# Patient Record
Sex: MALE | Race: WHITE | NOT HISPANIC OR LATINO | Employment: OTHER | URBAN - METROPOLITAN AREA
[De-identification: names, ages, dates, MRNs, and addresses within clinical notes are randomized per-mention and may not be internally consistent; named-entity substitution may affect disease eponyms.]

---

## 2017-01-26 ENCOUNTER — HOSPITAL ENCOUNTER (EMERGENCY)
Facility: HOSPITAL | Age: 37
Discharge: HOME | End: 2017-01-26
Attending: EMERGENCY MEDICINE
Payer: COMMERCIAL

## 2017-01-26 ENCOUNTER — APPOINTMENT (EMERGENCY)
Dept: RADIOLOGY | Facility: HOSPITAL | Age: 37
End: 2017-01-26
Payer: COMMERCIAL

## 2017-01-26 VITALS
BODY MASS INDEX: 34.36 KG/M2 | HEIGHT: 70 IN | SYSTOLIC BLOOD PRESSURE: 138 MMHG | HEART RATE: 68 BPM | OXYGEN SATURATION: 98 % | RESPIRATION RATE: 16 BRPM | WEIGHT: 240 LBS | DIASTOLIC BLOOD PRESSURE: 84 MMHG | TEMPERATURE: 97.9 F

## 2017-01-26 DIAGNOSIS — R07.9 CHEST PAIN: Primary | ICD-10-CM

## 2017-01-26 LAB
ANION GAP SERPL CALCULATED.3IONS-SCNC: 10 MMOL/L (ref 4–13)
BASOPHILS # BLD AUTO: 0.06 THOUSANDS/ΜL (ref 0–0.1)
BASOPHILS NFR BLD AUTO: 1 % (ref 0–1)
BUN SERPL-MCNC: 10 MG/DL (ref 5–25)
CALCIUM SERPL-MCNC: 9.6 MG/DL (ref 8.3–10.1)
CHLORIDE SERPL-SCNC: 103 MMOL/L (ref 100–108)
CO2 SERPL-SCNC: 28 MMOL/L (ref 21–32)
CREAT SERPL-MCNC: 0.92 MG/DL (ref 0.6–1.3)
EOSINOPHIL # BLD AUTO: 0.17 THOUSAND/ΜL (ref 0–0.61)
EOSINOPHIL NFR BLD AUTO: 2 % (ref 0–6)
ERYTHROCYTE [DISTWIDTH] IN BLOOD BY AUTOMATED COUNT: 12 % (ref 11.6–15.1)
GFR SERPL CREATININE-BSD FRML MDRD: >60 ML/MIN/1.73SQ M
GLUCOSE SERPL-MCNC: 98 MG/DL (ref 65–140)
HCT VFR BLD AUTO: 46.8 % (ref 36.5–49.3)
HGB BLD-MCNC: 15.7 G/DL (ref 12–17)
LYMPHOCYTES # BLD AUTO: 2.3 THOUSANDS/ΜL (ref 0.6–4.47)
LYMPHOCYTES NFR BLD AUTO: 25 % (ref 14–44)
MCH RBC QN AUTO: 29 PG (ref 26.8–34.3)
MCHC RBC AUTO-ENTMCNC: 33.5 G/DL (ref 31.4–37.4)
MCV RBC AUTO: 86 FL (ref 82–98)
MONOCYTES # BLD AUTO: 0.57 THOUSAND/ΜL (ref 0.17–1.22)
MONOCYTES NFR BLD AUTO: 6 % (ref 4–12)
NEUTROPHILS # BLD AUTO: 6.24 THOUSANDS/ΜL (ref 1.85–7.62)
NEUTS SEG NFR BLD AUTO: 67 % (ref 43–75)
NRBC BLD AUTO-RTO: 0 /100 WBCS
PLATELET # BLD AUTO: 287 THOUSANDS/UL (ref 149–390)
PMV BLD AUTO: 9.5 FL (ref 8.9–12.7)
POTASSIUM SERPL-SCNC: 3.8 MMOL/L (ref 3.5–5.3)
RBC # BLD AUTO: 5.42 MILLION/UL (ref 3.88–5.62)
SODIUM SERPL-SCNC: 141 MMOL/L (ref 136–145)
TROPONIN I SERPL-MCNC: <0.02 NG/ML
TROPONIN I SERPL-MCNC: <0.02 NG/ML
WBC # BLD AUTO: 9.37 THOUSAND/UL (ref 4.31–10.16)

## 2017-01-26 PROCEDURE — 85025 COMPLETE CBC W/AUTO DIFF WBC: CPT

## 2017-01-26 PROCEDURE — 36415 COLL VENOUS BLD VENIPUNCTURE: CPT

## 2017-01-26 PROCEDURE — 80048 BASIC METABOLIC PNL TOTAL CA: CPT

## 2017-01-26 PROCEDURE — 96374 THER/PROPH/DIAG INJ IV PUSH: CPT

## 2017-01-26 PROCEDURE — 84484 ASSAY OF TROPONIN QUANT: CPT

## 2017-01-26 PROCEDURE — 99285 EMERGENCY DEPT VISIT HI MDM: CPT

## 2017-01-26 PROCEDURE — 93005 ELECTROCARDIOGRAM TRACING: CPT

## 2017-01-26 PROCEDURE — 84484 ASSAY OF TROPONIN QUANT: CPT | Performed by: EMERGENCY MEDICINE

## 2017-01-26 PROCEDURE — 71020 HB CHEST X-RAY 2VW FRONTAL&LATL: CPT

## 2017-01-26 RX ORDER — NAPROXEN 500 MG/1
500 TABLET ORAL 2 TIMES DAILY WITH MEALS
Qty: 60 TABLET | Refills: 0 | Status: SHIPPED | OUTPATIENT
Start: 2017-01-26 | End: 2018-03-12 | Stop reason: ALTCHOICE

## 2017-01-26 RX ORDER — KETOROLAC TROMETHAMINE 30 MG/ML
30 INJECTION, SOLUTION INTRAMUSCULAR; INTRAVENOUS ONCE
Status: COMPLETED | OUTPATIENT
Start: 2017-01-26 | End: 2017-01-26

## 2017-01-26 RX ORDER — FAMOTIDINE 20 MG/1
20 TABLET, FILM COATED ORAL 2 TIMES DAILY
Qty: 60 TABLET | Refills: 0 | Status: SHIPPED | OUTPATIENT
Start: 2017-01-26 | End: 2018-03-12 | Stop reason: ALTCHOICE

## 2017-01-26 RX ADMIN — KETOROLAC TROMETHAMINE 30 MG: 30 INJECTION, SOLUTION INTRAMUSCULAR at 21:14

## 2017-01-27 LAB
ATRIAL RATE: 66 BPM
P AXIS: 9 DEGREES
PR INTERVAL: 162 MS
QRS AXIS: -10 DEGREES
QRSD INTERVAL: 96 MS
QT INTERVAL: 396 MS
QTC INTERVAL: 415 MS
T WAVE AXIS: 2 DEGREES
VENTRICULAR RATE: 66 BPM

## 2017-07-28 ENCOUNTER — HOSPITAL ENCOUNTER (EMERGENCY)
Facility: HOSPITAL | Age: 37
Discharge: HOME | End: 2017-07-28
Attending: EMERGENCY MEDICINE | Admitting: EMERGENCY MEDICINE
Payer: COMMERCIAL

## 2017-07-28 VITALS
HEART RATE: 83 BPM | WEIGHT: 240 LBS | DIASTOLIC BLOOD PRESSURE: 104 MMHG | HEIGHT: 70 IN | TEMPERATURE: 98.3 F | OXYGEN SATURATION: 96 % | BODY MASS INDEX: 34.36 KG/M2 | SYSTOLIC BLOOD PRESSURE: 173 MMHG | RESPIRATION RATE: 18 BRPM

## 2017-07-28 DIAGNOSIS — J01.90 ACUTE SINUSITIS TREATED WITH ANTIBIOTICS IN THE PAST 60 DAYS: ICD-10-CM

## 2017-07-28 DIAGNOSIS — J34.89 SINUS PRESSURE: Primary | ICD-10-CM

## 2017-07-28 PROCEDURE — 99282 EMERGENCY DEPT VISIT SF MDM: CPT

## 2017-07-28 RX ORDER — FLUTICASONE PROPIONATE 50 MCG
1 SPRAY, SUSPENSION (ML) NASAL DAILY
Qty: 16 G | Refills: 0 | Status: SHIPPED | OUTPATIENT
Start: 2017-07-28 | End: 2018-03-12 | Stop reason: ALTCHOICE

## 2017-07-28 RX ORDER — AMOXICILLIN AND CLAVULANATE POTASSIUM 875; 125 MG/1; MG/1
1 TABLET, FILM COATED ORAL EVERY 12 HOURS
Qty: 14 TABLET | Refills: 0 | Status: SHIPPED | OUTPATIENT
Start: 2017-07-28 | End: 2017-08-04

## 2018-03-12 ENCOUNTER — HOSPITAL ENCOUNTER (INPATIENT)
Facility: HOSPITAL | Age: 38
LOS: 1 days | Discharge: HOME | DRG: 914 | End: 2018-03-13
Attending: SURGERY | Admitting: SURGERY
Payer: COMMERCIAL

## 2018-03-12 ENCOUNTER — HOSPITAL ENCOUNTER (EMERGENCY)
Facility: HOSPITAL | Age: 38
End: 2018-03-12
Attending: EMERGENCY MEDICINE | Admitting: EMERGENCY MEDICINE
Payer: COMMERCIAL

## 2018-03-12 ENCOUNTER — APPOINTMENT (EMERGENCY)
Dept: RADIOLOGY | Facility: HOSPITAL | Age: 38
End: 2018-03-12
Payer: COMMERCIAL

## 2018-03-12 ENCOUNTER — APPOINTMENT (EMERGENCY)
Dept: CT IMAGING | Facility: HOSPITAL | Age: 38
End: 2018-03-12
Payer: COMMERCIAL

## 2018-03-12 VITALS
TEMPERATURE: 97.9 F | HEIGHT: 69 IN | RESPIRATION RATE: 22 BRPM | BODY MASS INDEX: 35.55 KG/M2 | DIASTOLIC BLOOD PRESSURE: 88 MMHG | SYSTOLIC BLOOD PRESSURE: 145 MMHG | WEIGHT: 240 LBS | OXYGEN SATURATION: 100 % | HEART RATE: 88 BPM

## 2018-03-12 DIAGNOSIS — R09.89 DECREASED DORSALIS PEDIS PULSE: ICD-10-CM

## 2018-03-12 DIAGNOSIS — M25.562 ACUTE PAIN OF LEFT KNEE: Primary | ICD-10-CM

## 2018-03-12 DIAGNOSIS — S87.02XA: Primary | ICD-10-CM

## 2018-03-12 PROBLEM — S89.92XA INJURY OF LEFT LOWER EXTREMITY: Status: ACTIVE | Noted: 2018-03-12

## 2018-03-12 LAB
ANION GAP SERPL CALCULATED.3IONS-SCNC: 8 MMOL/L (ref 4–13)
BASOPHILS # BLD AUTO: 0.08 THOUSANDS/ΜL (ref 0–0.1)
BASOPHILS NFR BLD AUTO: 1 % (ref 0–1)
BUN SERPL-MCNC: 14 MG/DL (ref 5–25)
CALCIUM SERPL-MCNC: 9.5 MG/DL (ref 8.3–10.1)
CHLORIDE SERPL-SCNC: 103 MMOL/L (ref 100–108)
CK MB SERPL-MCNC: 0.7 NG/ML (ref 0–5)
CK MB SERPL-MCNC: <1 % (ref 0–2.5)
CK SERPL-CCNC: 170 U/L (ref 39–308)
CO2 SERPL-SCNC: 28 MMOL/L (ref 21–32)
CREAT SERPL-MCNC: 0.8 MG/DL (ref 0.6–1.3)
EOSINOPHIL # BLD AUTO: 0.27 THOUSAND/ΜL (ref 0–0.61)
EOSINOPHIL NFR BLD AUTO: 2 % (ref 0–6)
ERYTHROCYTE [DISTWIDTH] IN BLOOD BY AUTOMATED COUNT: 11.9 % (ref 11.6–15.1)
GFR SERPL CREATININE-BSD FRML MDRD: 115 ML/MIN/1.73SQ M
GLUCOSE SERPL-MCNC: 87 MG/DL (ref 65–140)
HCT VFR BLD AUTO: 44.1 % (ref 36.5–49.3)
HGB BLD-MCNC: 15 G/DL (ref 12–17)
LYMPHOCYTES # BLD AUTO: 2.48 THOUSANDS/ΜL (ref 0.6–4.47)
LYMPHOCYTES NFR BLD AUTO: 20 % (ref 14–44)
MCH RBC QN AUTO: 29.5 PG (ref 26.8–34.3)
MCHC RBC AUTO-ENTMCNC: 34 G/DL (ref 31.4–37.4)
MCV RBC AUTO: 87 FL (ref 82–98)
MONOCYTES # BLD AUTO: 1.1 THOUSAND/ΜL (ref 0.17–1.22)
MONOCYTES NFR BLD AUTO: 9 % (ref 4–12)
NEUTROPHILS # BLD AUTO: 8.74 THOUSANDS/ΜL (ref 1.85–7.62)
NEUTS SEG NFR BLD AUTO: 69 % (ref 43–75)
NRBC BLD AUTO-RTO: 0 /100 WBCS
PLATELET # BLD AUTO: 269 THOUSANDS/UL (ref 149–390)
PMV BLD AUTO: 9.2 FL (ref 8.9–12.7)
POTASSIUM SERPL-SCNC: 3.8 MMOL/L (ref 3.5–5.3)
RBC # BLD AUTO: 5.09 MILLION/UL (ref 3.88–5.62)
SODIUM SERPL-SCNC: 139 MMOL/L (ref 136–145)
WBC # BLD AUTO: 12.71 THOUSAND/UL (ref 4.31–10.16)

## 2018-03-12 PROCEDURE — 73706 CT ANGIO LWR EXTR W/O&W/DYE: CPT

## 2018-03-12 PROCEDURE — 99222 1ST HOSP IP/OBS MODERATE 55: CPT | Performed by: SURGERY

## 2018-03-12 PROCEDURE — 96374 THER/PROPH/DIAG INJ IV PUSH: CPT

## 2018-03-12 PROCEDURE — 36415 COLL VENOUS BLD VENIPUNCTURE: CPT | Performed by: PHYSICIAN ASSISTANT

## 2018-03-12 PROCEDURE — 82553 CREATINE MB FRACTION: CPT | Performed by: PHYSICIAN ASSISTANT

## 2018-03-12 PROCEDURE — 96361 HYDRATE IV INFUSION ADD-ON: CPT

## 2018-03-12 PROCEDURE — 82550 ASSAY OF CK (CPK): CPT | Performed by: PHYSICIAN ASSISTANT

## 2018-03-12 PROCEDURE — 85025 COMPLETE CBC W/AUTO DIFF WBC: CPT | Performed by: PHYSICIAN ASSISTANT

## 2018-03-12 PROCEDURE — 80048 BASIC METABOLIC PNL TOTAL CA: CPT | Performed by: PHYSICIAN ASSISTANT

## 2018-03-12 PROCEDURE — 96372 THER/PROPH/DIAG INJ SC/IM: CPT

## 2018-03-12 PROCEDURE — 99285 EMERGENCY DEPT VISIT HI MDM: CPT

## 2018-03-12 PROCEDURE — 96376 TX/PRO/DX INJ SAME DRUG ADON: CPT

## 2018-03-12 RX ORDER — OXYCODONE HYDROCHLORIDE 5 MG/1
5 TABLET ORAL EVERY 4 HOURS PRN
Status: DISCONTINUED | OUTPATIENT
Start: 2018-03-12 | End: 2018-03-13 | Stop reason: HOSPADM

## 2018-03-12 RX ORDER — METHOCARBAMOL 500 MG/1
500 TABLET, FILM COATED ORAL EVERY 6 HOURS SCHEDULED
Status: DISCONTINUED | OUTPATIENT
Start: 2018-03-13 | End: 2018-03-13 | Stop reason: HOSPADM

## 2018-03-12 RX ORDER — ACETAMINOPHEN 325 MG/1
650 TABLET ORAL EVERY 6 HOURS PRN
Status: DISCONTINUED | OUTPATIENT
Start: 2018-03-12 | End: 2018-03-13 | Stop reason: HOSPADM

## 2018-03-12 RX ORDER — SODIUM CHLORIDE, SODIUM LACTATE, POTASSIUM CHLORIDE, CALCIUM CHLORIDE 600; 310; 30; 20 MG/100ML; MG/100ML; MG/100ML; MG/100ML
125 INJECTION, SOLUTION INTRAVENOUS CONTINUOUS
Status: DISCONTINUED | OUTPATIENT
Start: 2018-03-12 | End: 2018-03-13 | Stop reason: HOSPADM

## 2018-03-12 RX ORDER — ONDANSETRON 2 MG/ML
4 INJECTION INTRAMUSCULAR; INTRAVENOUS EVERY 8 HOURS PRN
Status: DISCONTINUED | OUTPATIENT
Start: 2018-03-12 | End: 2018-03-13 | Stop reason: HOSPADM

## 2018-03-12 RX ORDER — DIAZEPAM 5 MG/1
5 TABLET ORAL ONCE
Status: COMPLETED | OUTPATIENT
Start: 2018-03-12 | End: 2018-03-12

## 2018-03-12 RX ORDER — NICOTINE 21 MG/24HR
21 PATCH, TRANSDERMAL 24 HOURS TRANSDERMAL DAILY
Status: DISCONTINUED | OUTPATIENT
Start: 2018-03-12 | End: 2018-03-13 | Stop reason: HOSPADM

## 2018-03-12 RX ORDER — OXYCODONE HYDROCHLORIDE 10 MG/1
10 TABLET ORAL EVERY 4 HOURS PRN
Status: DISCONTINUED | OUTPATIENT
Start: 2018-03-12 | End: 2018-03-13 | Stop reason: HOSPADM

## 2018-03-12 RX ORDER — ACETAMINOPHEN 325 MG/1
650 TABLET ORAL ONCE
Status: COMPLETED | OUTPATIENT
Start: 2018-03-12 | End: 2018-03-12

## 2018-03-12 RX ADMIN — HYDROMORPHONE HYDROCHLORIDE 1 MG: 1 INJECTION, SOLUTION INTRAMUSCULAR; INTRAVENOUS; SUBCUTANEOUS at 18:21

## 2018-03-12 RX ADMIN — SODIUM CHLORIDE, SODIUM LACTATE, POTASSIUM CHLORIDE, AND CALCIUM CHLORIDE 125 ML/HR: .6; .31; .03; .02 INJECTION, SOLUTION INTRAVENOUS at 21:33

## 2018-03-12 RX ADMIN — IOHEXOL 100 ML: 350 INJECTION, SOLUTION INTRAVENOUS at 16:21

## 2018-03-12 RX ADMIN — SODIUM CHLORIDE 1000 ML: 0.9 INJECTION, SOLUTION INTRAVENOUS at 16:03

## 2018-03-12 RX ADMIN — DIAZEPAM 5 MG: 5 TABLET ORAL at 16:33

## 2018-03-12 RX ADMIN — HYDROMORPHONE HYDROCHLORIDE 1 MG: 1 INJECTION, SOLUTION INTRAMUSCULAR; INTRAVENOUS; SUBCUTANEOUS at 15:17

## 2018-03-12 RX ADMIN — ACETAMINOPHEN 650 MG: 325 TABLET, FILM COATED ORAL at 16:03

## 2018-03-12 RX ADMIN — HYDROMORPHONE HYDROCHLORIDE 1 MG: 1 INJECTION, SOLUTION INTRAMUSCULAR; INTRAVENOUS; SUBCUTANEOUS at 16:04

## 2018-03-12 RX ADMIN — HYDROMORPHONE HYDROCHLORIDE 0.5 MG: 1 INJECTION, SOLUTION INTRAMUSCULAR; INTRAVENOUS; SUBCUTANEOUS at 20:21

## 2018-03-12 RX ADMIN — NICOTINE 21 MG: 21 PATCH, EXTENDED RELEASE TRANSDERMAL at 21:33

## 2018-03-12 RX ADMIN — OXYCODONE HYDROCHLORIDE 10 MG: 10 TABLET ORAL at 23:47

## 2018-03-12 NOTE — EMTALA/ACUTE CARE TRANSFER
[address] [place] [address]  Dept: [phone]      JNGSQG TRANSFER CONSENT    NAME Jasmina MOYER 1980                              MRN [id number]    I have been informed of my rights regarding examination, treatment, and transfer   by Dr Jackie Huerta MD    Benefits: Specialized equipment and/or services available at the receiving facility (Include comment)________________________, Other benefits (Include comment)_______________________ (Trauma/Vascular)    Risks:        Consent for Transfer:  I acknowledge that my medical condition has been evaluated and explained to me by the emergency department physician or other qualified medical person and/or my attending physician, who has recommended that I be transferred to the service of  Accepting Physician: Dr Arianna Joel at [facility] Name, [address] : [facility]  The above potential benefits of such transfer, the potential risks associated with such transfer, and the probable risks of not being transferred have been explained to me, and I fully understand them  The doctor has explained that, in my case, the benefits of transfer outweigh the risks  I agree to be transferred  I authorize the performance of emergency medical procedures and treatments upon me in both transit and upon arrival at the receiving facility  Additionally, I authorize the release of any and all medical records to the receiving facility and request they be transported with me, if possible  I understand that the safest mode of transportation during a medical emergency is an ambulance and that the Hospital advocates the use of this mode of transport  Risks of traveling to the receiving facility by car, including absence of medical control, life sustaining equipment, such as oxygen, and medical personnel has been explained to me and I fully understand them      ([place] BELOW)  [ X ]  I consent to the stated transfer and to be transported by ambulance/helicopter  [  ]  I consent to the stated transfer, but refuse transportation by ambulance and accept full responsibility for my transportation by car  I understand the risks of non-ambulance transfers and I exonerate the Hospital and its staff from any deterioration in my condition that results from this refusal     X___________________________________________    DATE  18  TIME________  Signature of patient or legally responsible individual signing on patient behalf           RELATIONSHIP TO PATIENT_________________________          Provider Certification    NAME Garett Cabezas                                         1980                              MRN [id number]    A medical screening exam was performed on the above named patient  Based on the examination:    Condition Necessitating Transfer The primary encounter diagnosis was Crushing injury of left knee  A diagnosis of Decreased dorsalis pedis pulse was also pertinent to this visit      Patient Condition: The patient has been stabilized such that within reasonable medical probability, no material deterioration of the patient condition or the condition of the unborn child(nelly) is likely to result from the transfer    Reason for Transfer: Level of Care needed not available at this facility, Other (Include comment)____________________ (Trauma/Vascular)    Transfer Requirements: Facility [facility]   · Space available and qualified personnel available for treatment as acknowledged by Chase Alvarado  · Agreed to accept transfer and to provide appropriate medical treatment as acknowledged by       Dr Cristin Fernandez  · Appropriate medical records of the examination and treatment of the patient are provided at the time of transfer   [address] _______  · Transfer will be performed by qualified personnel from [facility]  and appropriate transfer equipment as required, including the use of necessary and appropriate life support measures  Provider Certification: I have examined the patient and explained the following risks and benefits of being transferred/refusing transfer to the patient/family:  General risk, such as traffic hazards, adverse weather conditions, rough terrain or turbulence, possible failure of equipment (including vehicle or aircraft), or consequences of actions of persons outside the control of the transport personnel      Based on these reasonable risks and benefits to the patient and/or the unborn child(nelly), and based upon the information available at the time of the patients examination, I certify that the medical benefits reasonably to be expected from the provision of appropriate medical treatments at another medical facility outweigh the increasing risks, if any, to the individuals medical condition, and in the case of labor to the unborn child, from effecting the transfer      X____________________________________________ DATE 03/12/18        TIME_______      ORIGINAL - SEND TO MEDICAL RECORDS   COPY - SEND WITH PATIENT DURING TRANSFER

## 2018-03-12 NOTE — ED NOTES
Patient trembling when he returned from C-scan   MD placed new orders which were followed     Millie Umana RN  03/12/18 0963

## 2018-03-12 NOTE — ED ATTENDING ATTESTATION
Issac Beltrán MD, saw and evaluated the patient  I have discussed the patient with the resident/non-physician practitioner and agree with the resident's/non-physician practitioner's findings, Plan of Care, and MDM as documented in the resident's/non-physician practitioner's note, except where noted  All available labs and Radiology studies were reviewed  At this point I agree with the current assessment done in the Emergency Department  I have conducted an independent evaluation of this patient a history and physical is as follows:      Critical Care Time  CritCare Time    Procedures     Patient is a 27-year-old male who had his left knee crushed between 2 trees  He has swelling to the left knee  Decreased pulses in the left lower extremity  There was no injury to the back of the knee and no mechanism to suggest a posterior knee dislocation however, given the decrease pulses be of concern for vascular injury  There are still pulses and the foot is warm  All of the compartments are soft  Will check ck

## 2018-03-13 ENCOUNTER — APPOINTMENT (INPATIENT)
Dept: RADIOLOGY | Facility: HOSPITAL | Age: 38
DRG: 914 | End: 2018-03-13
Payer: COMMERCIAL

## 2018-03-13 VITALS
HEIGHT: 69 IN | WEIGHT: 240.3 LBS | BODY MASS INDEX: 35.59 KG/M2 | RESPIRATION RATE: 20 BRPM | HEART RATE: 76 BPM | OXYGEN SATURATION: 99 % | TEMPERATURE: 98.6 F | SYSTOLIC BLOOD PRESSURE: 134 MMHG | DIASTOLIC BLOOD PRESSURE: 90 MMHG

## 2018-03-13 PROBLEM — D72.829 LEUKOCYTOSIS: Status: ACTIVE | Noted: 2018-03-13

## 2018-03-13 PROBLEM — D72.829 LEUKOCYTOSIS: Status: RESOLVED | Noted: 2018-03-13 | Resolved: 2018-03-13

## 2018-03-13 PROBLEM — M25.569 ACUTE KNEE PAIN: Status: ACTIVE | Noted: 2018-03-13

## 2018-03-13 PROBLEM — F17.200 NICOTINE DEPENDENCE: Status: ACTIVE | Noted: 2018-03-13

## 2018-03-13 LAB
BASOPHILS # BLD AUTO: 0.05 THOUSANDS/ΜL (ref 0–0.1)
BASOPHILS NFR BLD AUTO: 1 % (ref 0–1)
EOSINOPHIL # BLD AUTO: 0.42 THOUSAND/ΜL (ref 0–0.61)
EOSINOPHIL NFR BLD AUTO: 5 % (ref 0–6)
ERYTHROCYTE [DISTWIDTH] IN BLOOD BY AUTOMATED COUNT: 12.6 % (ref 11.6–15.1)
HCT VFR BLD AUTO: 39.8 % (ref 36.5–49.3)
HGB BLD-MCNC: 13.5 G/DL (ref 12–17)
LYMPHOCYTES # BLD AUTO: 2.32 THOUSANDS/ΜL (ref 0.6–4.47)
LYMPHOCYTES NFR BLD AUTO: 28 % (ref 14–44)
MCH RBC QN AUTO: 29.9 PG (ref 26.8–34.3)
MCHC RBC AUTO-ENTMCNC: 33.9 G/DL (ref 31.4–37.4)
MCV RBC AUTO: 88 FL (ref 82–98)
MONOCYTES # BLD AUTO: 1.03 THOUSAND/ΜL (ref 0.17–1.22)
MONOCYTES NFR BLD AUTO: 12 % (ref 4–12)
NEUTROPHILS # BLD AUTO: 4.55 THOUSANDS/ΜL (ref 1.85–7.62)
NEUTS SEG NFR BLD AUTO: 54 % (ref 43–75)
NRBC BLD AUTO-RTO: 0 /100 WBCS
PLATELET # BLD AUTO: 245 THOUSANDS/UL (ref 149–390)
PMV BLD AUTO: 9.4 FL (ref 8.9–12.7)
RBC # BLD AUTO: 4.51 MILLION/UL (ref 3.88–5.62)
WBC # BLD AUTO: 8.39 THOUSAND/UL (ref 4.31–10.16)

## 2018-03-13 PROCEDURE — 99238 HOSP IP/OBS DSCHRG MGMT 30/<: CPT | Performed by: EMERGENCY MEDICINE

## 2018-03-13 PROCEDURE — 73700 CT LOWER EXTREMITY W/O DYE: CPT

## 2018-03-13 PROCEDURE — 0S9D3ZZ DRAINAGE OF LEFT KNEE JOINT, PERCUTANEOUS APPROACH: ICD-10-PCS | Performed by: ORTHOPAEDIC SURGERY

## 2018-03-13 PROCEDURE — 85025 COMPLETE CBC W/AUTO DIFF WBC: CPT | Performed by: SURGERY

## 2018-03-13 PROCEDURE — G8979 MOBILITY GOAL STATUS: HCPCS

## 2018-03-13 PROCEDURE — 73560 X-RAY EXAM OF KNEE 1 OR 2: CPT

## 2018-03-13 PROCEDURE — 97163 PT EVAL HIGH COMPLEX 45 MIN: CPT

## 2018-03-13 PROCEDURE — G8978 MOBILITY CURRENT STATUS: HCPCS

## 2018-03-13 RX ORDER — OXYCODONE HYDROCHLORIDE 10 MG/1
TABLET ORAL
Status: COMPLETED
Start: 2018-03-13 | End: 2018-03-13

## 2018-03-13 RX ORDER — NICOTINE 21 MG/24HR
PATCH, TRANSDERMAL 24 HOURS TRANSDERMAL
Status: COMPLETED
Start: 2018-03-13 | End: 2018-03-13

## 2018-03-13 RX ORDER — METHOCARBAMOL 500 MG/1
500 TABLET, FILM COATED ORAL EVERY 6 HOURS SCHEDULED
Qty: 30 TABLET | Refills: 0 | Status: SHIPPED | OUTPATIENT
Start: 2018-03-13 | End: 2018-12-02

## 2018-03-13 RX ORDER — METHOCARBAMOL 500 MG/1
TABLET, FILM COATED ORAL
Status: DISPENSED
Start: 2018-03-13 | End: 2018-03-13

## 2018-03-13 RX ORDER — OXYCODONE HYDROCHLORIDE 5 MG/1
TABLET ORAL
Status: DISCONTINUED
Start: 2018-03-13 | End: 2018-03-13 | Stop reason: HOSPADM

## 2018-03-13 RX ORDER — OXYCODONE HYDROCHLORIDE 5 MG/1
5 TABLET ORAL EVERY 4 HOURS PRN
Qty: 20 TABLET | Refills: 0 | Status: SHIPPED | OUTPATIENT
Start: 2018-03-13 | End: 2018-03-23

## 2018-03-13 RX ADMIN — OXYCODONE HYDROCHLORIDE 10 MG: 10 TABLET ORAL at 03:27

## 2018-03-13 RX ADMIN — OXYCODONE HYDROCHLORIDE 10 MG: 10 TABLET ORAL at 08:03

## 2018-03-13 RX ADMIN — NICOTINE 21 MG: 21 PATCH, EXTENDED RELEASE TRANSDERMAL at 08:03

## 2018-03-13 RX ADMIN — NICOTINE: 21 PATCH, EXTENDED RELEASE TRANSDERMAL at 08:05

## 2018-03-13 RX ADMIN — ENOXAPARIN SODIUM 40 MG: 40 INJECTION SUBCUTANEOUS at 08:04

## 2018-03-13 RX ADMIN — METHOCARBAMOL 500 MG: 500 TABLET ORAL at 00:10

## 2018-03-13 RX ADMIN — METHOCARBAMOL 500 MG: 500 TABLET ORAL at 12:16

## 2018-03-13 RX ADMIN — HYDROMORPHONE HYDROCHLORIDE 0.5 MG: 1 INJECTION, SOLUTION INTRAMUSCULAR; INTRAVENOUS; SUBCUTANEOUS at 06:12

## 2018-03-13 RX ADMIN — HYDROMORPHONE HYDROCHLORIDE 0.5 MG: 1 INJECTION, SOLUTION INTRAMUSCULAR; INTRAVENOUS; SUBCUTANEOUS at 10:39

## 2018-03-13 RX ADMIN — OXYCODONE HYDROCHLORIDE 10 MG: 10 TABLET ORAL at 14:59

## 2018-03-13 RX ADMIN — METHOCARBAMOL 500 MG: 500 TABLET ORAL at 18:45

## 2018-03-13 RX ADMIN — HYDROMORPHONE HYDROCHLORIDE 0.5 MG: 1 INJECTION, SOLUTION INTRAMUSCULAR; INTRAVENOUS; SUBCUTANEOUS at 00:56

## 2018-03-13 RX ADMIN — METHOCARBAMOL 500 MG: 500 TABLET ORAL at 06:12

## 2018-03-13 NOTE — SOCIAL WORK
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

## 2018-03-13 NOTE — TERTIARY TRAUMA SURVEY
Progress Note - Tertiary Trauma Survery   Cheyanne Jacobsen 40 y o  male MRN: [id number]  Unit/Bed#: [facility] 920-01 Encounter: [id number]    Summary of Diagnosed Injuries:     * Injury of left lower extremity   Assessment & Plan    Continue q 2 hour neurovascular checks to r/out development of compartment syndrome    Ice/ elevate for comfort   PT/OT evaluation   Lovenox for DVT ppx         Acute knee pain - left    Assessment & Plan    Follow up w/ ortho - plan to aspirate knee & inject lidocaine for re-examination    Ice/ elevate for comfort   PT/OT evaluation   Lovenox for DVT ppx         Leukocytosis   Assessment & Plan    Resolved - Was likely reactive & due to his trauma   No need to recheck unless pt spikes a fever         Nicotine dependence   Assessment & Plan    Continue nicotine patches daily           Mechanism of Injury: Fall     Transfer from: [facility]  Outside Films Received: Yes   Tertiary Exam Due on: 3/13/18    Vitals: Blood pressure 118/71, pulse 80, temperature 97 9 °F (36 6 °C), temperature source Oral, resp  rate 20, weight 109 kg (240 lb 4 8 oz), SpO2 93 %  ,Body mass index is 35 49 kg/m²      CT / RADIOGRAPHS: ALL RESULTS MUST BE CONFIRMED BY FACULTY OR PRINTED REPORT    CT HEAD:  CT CHEST:    CT FACE:  CT ABDOMEN / PELVIS:   CT CERVICAL SPINE:  XR PELVIS:    CT THORACIC / LUMBAR SPINE:  CXR CHEST:    CTA Lower ext - read was faxed over with the pt - WNL -  OTHER:    OTHER:  OTHER:    OTHER: OTHER:    OTHER:  OTHER:    OTHER:  OTHER:      Consultants - List Service/ Faculty and Date: Orthopedics     Active medications:           Current Facility-Administered Medications:     acetaminophen (TYLENOL) tablet 650 mg, 650 mg, Oral, Q6H PRN    enoxaparin (LOVENOX) subcutaneous injection 40 mg, 40 mg, Subcutaneous, Q24H [address]    HYDROmorphone (DILAUDID) injection 0 5 mg, 0 5 mg, Intravenous, Q4H PRN, 0 5 mg at 03/13/18 0612    lactated ringers infusion, 125 mL/hr, Intravenous, Continuous, 125 mL/hr at 03/12/18 2133    methocarbamol (ROBAXIN) tablet 500 mg, 500 mg, Oral, Q6H AMANDA, 500 mg at 03/13/18 0612    nicotine (NICODERM CQ) 21 mg/24 hr TD 24 hr patch 21 mg, 21 mg, Transdermal, Daily, 21 mg at 03/12/18 2133    ondansetron (ZOFRAN) injection 4 mg, 4 mg, Intravenous, Q8H PRN    oxyCODONE (ROXICODONE) immediate release tablet 10 mg, 10 mg, Oral, Q4H PRN, 10 mg at 03/13/18 0327    oxyCODONE (ROXICODONE) IR tablet 5 mg, 5 mg, Oral, Q4H PRN    No intake or output data in the 24 hours ending 03/13/18 0653    Invasive Devices     Peripheral Intravenous Line            Peripheral IV 03/12/18 Right Antecubital less than 1 day                CAGE-AID Questionnaire:    Was the patient able to participate in the CAGE-AID screening questions on admission? Yes  Patient has a history of alcohol abuse - is currently 7 months sober  Is the patient 65 years or older: No    1  GCS:  GCS Total:  15  2  Head:   WNL  3  Neck:   WNL  4  Chest:   WNL  5  Abdomen/Pelvis:   WNL  6  Back (log roll with spinal immobilization unless cleared radiographically): WNL  7  Extremities:   Lacs, abrasions, swelling, ecchymosis: left lateral thigh abrasions and mild amount of erythema  Knee is swollen and tender to palpation  No crepitus  Pt is only able to minimally flex his knee due to pain  Lower calf is soft - no firmness - 1+ Palp DP - confirmed w/ doppler   Right lower extremity - palp DP/PT     Tenderness &  pain with bending left knee  8  Peripheral Nerves:  Decreased sensation of toes on left lower ext  No complaints regarding right lower ext     Do NOT use the following abbreviations: DTO, gr, Jazmín, MS, MSO4, MgSO4, Nitro, QD, QID, QOD, u, , ?, ?g or trailing zeros   Always use a zero before a decimal     Labs:   CBC:   Lab Results   Component Value Date    WBC 8 39 03/13/2018    HGB 13 5 03/13/2018    HCT 39 8 03/13/2018    MCV 88 03/13/2018     03/13/2018    MCH 29 9 03/13/2018    MCHC 33 9 03/13/2018    RDW 12 6 03/13/2018    MPV 9 4 03/13/2018    NRBC 0 03/13/2018     CMP:   Lab Results   Component Value Date     03/12/2018     03/12/2018    CO2 28 03/12/2018    ANIONGAP 8 03/12/2018    BUN 14 03/12/2018    CREATININE 0 80 03/12/2018    GLUCOSE 87 03/12/2018    CALCIUM 9 5 03/12/2018    EGFR 115 03/12/2018     Phosphorus: No results found for: PHOS  Magnesium: No results found for: MG  Urinalysis: No results found for: Inetta Hench, SPECGRAV, PHUR, LEUKOCYTESUR, NITRITE, PROTEINUA, GLUCOSEU, KETONESU, BILIRUBINUR, BLOODU  Ionized Calcium: No results found for: CAION  Coagulation: No results found for: PT, INR, APTT  Troponin: No results found for: TROPONINI  ABG: No results found for: PHART, UWS0HGL, PO2ART, BTK5HPL, O6RCOFND, BEART, SOURCE

## 2018-03-13 NOTE — PROCEDURES
Procedure- Orthopedics   Buddy Estevez 40 y o  male MRN: [id number]  Unit/Bed#: [facility] 920-01    Procedure: left knee aspiration    After sterile preparation of the skin overlying the knee local anesthetic was provided with 3cc of 1% lidocaine and 3cc of 0 25% bupivicaine  An 18 gauge needle was then  inserted via a superior lateral portal   Approx 60cc of iban blood was aspirated  No fat droplets were appreciated in the aspiration fluid  Sterile dressing was then applied  Pt tolerated the procedure well and was neurovascularly intact both pre and post procedure      Vern Huff MD

## 2018-03-13 NOTE — ED NOTES
Pulses obtained in left foot via dopple, right foot palpable     Kandy Spurling, CALLIE  03/12/18 0357

## 2018-03-13 NOTE — DISCHARGE SUMMARY
Discharge- Marjorie Alu 1980, 40 y o  male MRN: [id number]    Unit/Bed#: [facility] 920-01 Encounter: [id number]    Primary Care Provider: Iram Xie MD   Date and time admitted to hospital: 3/12/2018  7:40 PM      Nicotine dependence   Assessment & Plan    Continue nicotine patch        Acute knee pain - left    Assessment & Plan    Status post aspiration today  Pending CT left knee for occult fracture today  Outpatient MRI left knee  WBAT LLE in HKB unlocked  Analgesia, ice/ elevate for comfort   PT evaluation   Lovenox for DVT ppx            * Injury of left lower extremity   Assessment & Plan    Continue q 2 hour neurovascular checks to r/out development of compartment syndrome    Ice/ elevate for comfort   PT/OT evaluation   Lovenox for DVT ppx         Leukocytosis-resolved as of 3/13/2018   Assessment & Plan    Resolved - Was likely reactive & due to his trauma   No need to recheck unless pt spikes a fever               Resolved Problems  Date Reviewed: 3/13/2018          Resolved    Leukocytosis 3/13/2018     Resolved by  Kimbelry Auguste MD          Admission Date: 3/12/2018     Admitting Diagnosis: Leg injury [S89 90XA]  Acute pain of left knee [M25 562]    HPI: 40 y o  male with no past medical history  He presents as a [facility]  Earlier 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  Somehow the patient fell down a hill and managed to get his leg stuck in between a tree and a log  His left leg was wedged in between the log /tree from roughly 20 minutes until he and his friend were able to dislodge the log  The pt then proceeded to work the remainder of the day (6additional hours) even though he was unable to walk on his left lower extremity  He then decided to go to the ED for evaluation  There he complained that his left foot felt 'weird' and cold  His main complaint however was knee pain  He said he was able to bend his knee but was refraining from doing so b/c of the pain   CTA of his lower extremity was preformed and was normal  Due to concern for development of compartment syndrome he was transferred to [facility] for a higher level of care  When assessed at [facility] his left foot was cold in comparison to his right however he had dopplerable DP & PT pulses b/l  ABIs were obtained (Right 1 17 Left 1 13) he denies calf pain with passive flexion / extension - but continued to c/o pain in his left knee  Medial and lateral compartments were soft with out any firmness noted     Procedures Performed: No orders of the defined types were placed in this encounter  Hospital Course: Orthopaedics was consulted, who aspirated his knee for 60 cc of iban blood  A follow up CT scan of his knee revealed no occult fractures, and a tertiary examination yielded no other injuries  He remained neurovascularly intact throughout his hospital stay, and his leg stayed soft and compressible with no concerns for compartment syndrome  After adequate pain control, he was deemed medically stable for discharge by all members of the care team  Daily discussion was had with patient and/or family  Significant Findings, Care, Treatment and Services Provided:   Orthopedics (6/85)    Complications: None    Condition at Discharge: good     Discharge instructions/Information to patient and family:   See after visit summary for information provided to patient and family  Provisions for Follow-Up Care:  See after visit summary for information related to follow-up care and any pertinent home health orders  Disposition: Home    Planned Readmission: No    Discharge Statement   I spent 30 minutes discharging the patient  This time was spent on the day of discharge  I had direct contact with the patient on the day of discharge  Additional documentation is required if more than 30 minutes were spent on discharge  Discharge Medications:  See after visit summary for reconciled discharge medications provided to patient and family

## 2018-03-13 NOTE — PROGRESS NOTES
Pt states his pain is the same   Pt's left lower extremity compartments are all still soft   Sensation is intact b/l    Knee immobilizer is on   Signals present     Will continue to monitor    Eve Lay  12:36 AM  03/13/18

## 2018-03-13 NOTE — ORTHOTIC NOTE
Orthotic Note            Date: 3/13/2018      Patient Name: Mikal Jarvis        Time: 13:20pm-13:15pm 55mins    Reason for Consult:  Patient Active Problem List   Diagnosis    Injury of left lower extremity    Acute knee pain - left     Nicotine dependence   LLE Breg G3 Cool Hinged Knee Brace  "Unlocked"  Per Orthopedics    I measured, fit and donned Breg G3 Cool Hinged Knee Brace "Unlocked" to patient's LLE while he was sitting up in bed  Patient tolerated well stating that he has had one in the past   Instructions/adjustments reviewed at this time  Instructions and my contact information at bedside  I will continue to follow up with patient daily  Recommendations:  Please call Mobility Coordinator at ext  [phone] in regards to bracing instruction and/or adjustment  Sadaf Thao Mobility Coordinator DAIVDFo, LCOF, ASOP R  O T, O B T

## 2018-03-13 NOTE — OCCUPATIONAL THERAPY NOTE
OCCUPATIONAL THERAPY CANCEL NOTE:    ORDERS RECEIVED  CHART REVIEW COMPLETED  DURING ROUNDING WITH THE MEDICAL TEAM IT WAS DETERMINED PT IS PENDING CT OF L KNEE TO R/U FRACTURE  WILL HOLD THERAPY AT THIS TIME AND CONTINUE TO FOLLOW  TO COMPLETE OT EVAL AS MEDICALLY APPROPRIATE/ABLE       Chisé Diacik, MOT, OTR/L

## 2018-03-13 NOTE — PHYSICAL THERAPY NOTE
Physical Therapy Evaluation    Patient's Name:Binu Martin    Today's Date:03/13/18    Patient Active Problem List   Diagnosis    Injury of left lower extremity    Acute knee pain - left     Nicotine dependence       History reviewed  No pertinent past medical history  Past Surgical History:   Procedure Laterality Date    ROOT CANAL          03/13/18 5745   Pain Assessment   Pain Assessment 0-10   Pain Score 7   Pain Type Acute pain   Pain Location Knee   Pain Orientation Left   Hospital Pain Intervention(s) Ambulation/increased activity   Response to Interventions unchanged   Home Living   Type of [address] One level  (4 SOPHIA)   Prior Function   Level of [place] Independent with ADLs and functional mobility   Lives With Spouse   Receives Help From Family   Restrictions/Precautions   Weight Bearing Precautions Per Order Yes   LLE Weight Bearing Per Order NWB  (pend CT)   Braces or Orthoses LE Braces  (HKB unlocked)   General   Family/Caregiver Present No   Cognition   Overall Cognitive Status WFL   Arousal/Participation Alert   Orientation Level Oriented X4   Following Commands Follows all commands and directions without difficulty   RUE Assessment   RUE Assessment WFL   LUE Assessment   LUE Assessment WFL   RLE Assessment   RLE Assessment X   Strength RLE   RLE Overall Strength 4/5   LLE Assessment   LLE Assessment X   Strength LLE   LLE Overall Strength 2+/5   Coordination   Movements are Fluid and Coordinated 0   Coordination and Movement Description antalgic LLE NWB   Bed Mobility   Supine to Sit 7  Independent   Transfers   Sit to Stand 5  Supervision   Additional items Increased time required;Verbal cues   Stand to Sit 5  Supervision   Additional items Increased time required;Verbal cues   Stand pivot 5  Supervision   Additional items Increased time required;Verbal cues  (RW)   Ambulation/Elevation   Gait pattern Improper Weight shift; Antalgic  (LLE NWB)   Gait Assistance 5 Supervision   Additional items Verbal cues   Assistive Device Rolling walker   Distance 80 ft   Stair Management Assistance 5  Supervision   Additional items Verbal cues   Stair Management Technique Two rails   Number of Stairs 2   Balance   Dynamic Sitting Fair  (multidirectional reach)   Static Standing Fair +  (RW)   Dynamic Standing Fair -  (RW)   Endurance Deficit   Endurance Deficit Yes   Endurance Deficit Description antalgic LE instability   Activity Tolerance   Activity Tolerance Patient limited by pain   Medical Staff Made Aware CM   Nurse Made Aware yes   Assessment   Prognosis Good   Problem List Decreased strength;Decreased range of motion;Decreased endurance; Impaired balance;Decreased mobility; Decreased coordination; Impaired judgement;Decreased safety awareness;Decreased skin integrity;Orthopedic restrictions;Pain   Assessment Pt is a 40 y o  male [facility]12[facility]2018 s/p tree crush injury w/ crush Injury of left lower extremity; currently s/p aspiration and NWB in unlocked HKB pend diagnostics r/o occult fx Pt exhibits significant impairments with weakness, decreased ROM, impaired balance, decreased endurance, impaired coordination, gait deviations, pain, decreased activity tolerance, decreased functional mobility tolerance, decreased safety awareness, impaired judgement, fall risk, orthopedic restrictions and decreased skin integrity; these impact independence with mobility, ADLs, and IADLs; requires supervision w transf and amb 80 ft using RW able to maintain LLE NWB w unlocked HKB; also requires supervision up/down 2 steps w ascent backward and descent forward using bilat UE support on rails; objective measures on the Barthel Index also reveal limitations;  therapy prognosis is impacted by relevant co morbidities as noted in evaluation; clinical presentation is currently unstable/unpredictable ; PTA, pt was Independent with mobility lives in single level setup 4 SOPHIA, reports fam support available skilled PT is indicated to to optimize functional independence and discharge planning; pending functional progress, PT recommendation at discharge is home w fam support RW; rec f/u outpt PT  For LE strengthening when med cleared   Goals   Patient Goals go home   STG Expiration Date 03/27/18   Short Term Goal #1 1  Independent with Bed Mobility Rolling Right and Left     2  Independent with Bed Mobility Supine-Sit     3  Modified Independent with Transfer Bed-Chair     4  Increase Dynamic Sitting Balance at least 1 Grade for improved stability with functional reach activities     5  Increase Dynamic Standing Balance at least 1 Grade for improved ease with Activities of Daily Living     6  Increase Lower Extremity Strength at least 1 Grade for improved ease mobility tasks     7  Modified Independent with  Ambulation 150 feet using RW LLE NWB HKB unlockedto facilitate home and community mobility 8  Mod independent with Ascending/Descending 14 steps to facilitate home and community accessibility  Plan   Treatment/Interventions Functional transfer training;LE strengthening/ROM; Elevations; Therapeutic exercise; Endurance training;Cognitive reorientation;Patient/family training;Equipment eval/education; Bed mobility;Gait training; Compensatory technique education;Continued evaluation;Spoke to nursing;Spoke to case management   PT Frequency (6x/wk)   Recommendation   Recommendation Home with family support   Equipment Recommended Walker   Barthel Index   Feeding 10   Bathing 0   Grooming Score 5   Dressing Score 5   Bladder Score 10   Bowels Score 10   Toilet Use Score 5   Transfers (Bed/Chair) Score 10   Mobility (Level Surface) Score 0   Stairs Score 5   Barthel Index Score 60

## 2018-03-13 NOTE — DISCHARGE INSTRUCTIONS
Discharge Instructions - Orthopedics  Marjorie Acevedo 40 y o  male MRN: [id number]  Unit/Bed#: [facility] 920-01    Weight Bearing Status:                                           Weight bearing as tolerated left lower extremity in hinged knee brace unlocked for comfort     Pain:  Continue analgesics as directed    PT/OT:  Continue PT/OT on outpatient basis as directed    Appt Instructions: If you do not have your appointment, please call the clinic at [phone] to f/u with Dr Rik Thomson after obtaining MRI of left knee  Otherwise followup as scheduled below:    Contact the office sooner if you experience any increased numbness/tingling in the extremities

## 2018-03-13 NOTE — PLAN OF CARE
Problem: PHYSICAL THERAPY ADULT  Goal: Performs mobility at highest level of function for planned discharge setting  See evaluation for individualized goals  Treatment/Interventions: Functional transfer training, LE strengthening/ROM, Elevations, Therapeutic exercise, Endurance training, Cognitive reorientation, Patient/family training, Equipment eval/education, Bed mobility, Gait training, Compensatory technique education, Continued evaluation, Spoke to nursing, Spoke to case management  Equipment Recommended: Stefano Mu       See flowsheet documentation for full assessment, interventions and recommendations    Prognosis: Good  Problem List: Decreased strength, Decreased range of motion, Decreased endurance, Impaired balance, Decreased mobility, Decreased coordination, Impaired judgement, Decreased safety awareness, Decreased skin integrity, Orthopedic restrictions, Pain  Assessment: Pt is a 40 y o  male admitted to [facility] on 3/12/2018 s/p tree crush injury w/ crush Injury of left lower extremity; currently s/p aspiration and NWB in unlocked HKB pend diagnostics r/o occult fx Pt exhibits significant impairments with weakness, decreased ROM, impaired balance, decreased endurance, impaired coordination, gait deviations, pain, decreased activity tolerance, decreased functional mobility tolerance, decreased safety awareness, impaired judgement, fall risk, orthopedic restrictions and decreased skin integrity; these impact independence with mobility, ADLs, and IADLs; requires supervision w transf and amb 80 ft using RW able to maintain LLE NWB w unlocked HKB; also requires supervision up/down 2 steps w ascent backward and descent forward using bilat UE support on rails; objective measures on the Barthel Index also reveal limitations;  therapy prognosis is impacted by relevant co morbidities as noted in evaluation; clinical presentation is currently unstable/unpredictable ; PTA, pt was Independent with mobility lives in single level setup 4 SOPHIA, reports fam support available skilled PT is indicated to to optimize functional independence and discharge planning; pending functional progress, PT recommendation at discharge is home w fam support RW; rec f/u outpt PT  For LE strengthening when med cleared        Recommendation: Home with family support          See flowsheet documentation for full assessment

## 2018-03-13 NOTE — ASSESSMENT & PLAN NOTE
Continue q 2 hour neurovascular checks to r/out development of compartment syndrome    Ice/ elevate for comfort   PT/OT evaluation   Lovenox for DVT ppx

## 2018-03-13 NOTE — CONSULTS
Orthopedics   Lani Can 40 y o  male MRN: [id number]  Unit/Bed#: [facility] 920-01      Chief Complaint:   left knee pain    HPI:   40 y o male complaining of left knee pain  Patient was at work yesterday as a  when a tree fell down a Hill and pinned his left knee for approximately 20 30 minutes  Following this, patient complained of left knee buckling, left knee pain and decreased sensation in left foot  Pain is well localized to medial aspect of left knee  Pain is made worse with direct palpation affected area with attempted ambulation  Pain subsided somewhat at rest   Patient admits to tingling in his toes but denies iban numbness  Patient states that swelling and tingling has improved dramatically since yesterday  Review Of Systems:   · Skin: left knee effusion  · Neuro: See HPI  · Musculoskeletal: See HPI  · 14 point review of systems negative except as stated above     Past Medical History:   History reviewed  No pertinent past medical history  Past Surgical History:   Past Surgical History:   Procedure Laterality Date    ROOT CANAL         Family History:  Family history reviewed and non-contributory  History reviewed  No pertinent family history      Social History:  Social History     Social History    Marital status: /Civil Union     Spouse name: N/A    Number of children: N/A    Years of education: N/A     Social History Main Topics    Smoking status: Current Every Day Smoker     Packs/day: 1 00    Smokeless tobacco: Never Used    Alcohol use No    Drug use: No    Sexual activity: Not Asked     Other Topics Concern    None     Social History Narrative    None       Allergies:   No Known Allergies        Labs:    0  Lab Value Date/Time   HCT 39 8 03/13/2018 0511   HCT 44 1 03/12/2018 1602   HCT 46 8 01/26/2017 1811   HGB 13 5 03/13/2018 0511   HGB 15 0 03/12/2018 1602   HGB 15 7 01/26/2017 1811   WBC 8 39 03/13/2018 0511   WBC 12 71 (H) 03/12/2018 1602   WBC 9 37 01/26/2017 1811       Meds:    Current Facility-Administered Medications:     acetaminophen (TYLENOL) tablet 650 mg, 650 mg, Oral, Q6H PRN, Vallarie Penelope    enoxaparin (LOVENOX) subcutaneous injection 40 mg, 40 mg, Subcutaneous, Q24H [facility], Vallarie Penelope    HYDROmorphone (DILAUDID) injection 0 5 mg, 0 5 mg, Intravenous, Q4H PRN, Vallarie Penelope, 0 5 mg at 03/13/18 0612    lactated ringers infusion, 125 mL/hr, Intravenous, Continuous, Vallarie Penelope, Last Rate: 125 mL/hr at 03/12/18 2133, 125 mL/hr at 03/12/18 2133    methocarbamol (ROBAXIN) tablet 500 mg, 500 mg, Oral, Q6H [facility], Vallarie Penelope, 500 mg at 03/13/18 0612    nicotine (NICODERM CQ) 21 mg/24 hr TD 24 hr patch 21 mg, 21 mg, Transdermal, Daily, Vallarie Penelope, 21 mg at 03/12/18 2133    ondansetron (ZOFRAN) injection 4 mg, 4 mg, Intravenous, Q8H PRN, Vallarie Penelope    oxyCODONE (ROXICODONE) immediate release tablet 10 mg, 10 mg, Oral, Q4H PRN, Vallarie Penelope, 10 mg at 03/13/18 0327    oxyCODONE (ROXICODONE) IR tablet 5 mg, 5 mg, Oral, Q4H PRN, Vallarie Penelope    Blood Culture:   No results found for: BLOODCX    Wound Culture:   No results found for: WOUNDCULT    Ins and Outs:  No intake/output data recorded  Physical Exam:   /71 (BP Location: Right arm)   Pulse 80   Temp 97 9 °F (36 6 °C) (Oral)   Resp 20   Wt 109 kg (240 lb 4 8 oz)   SpO2 93%   BMI 35 49 kg/m²   Gen: Alert and oriented to person, place, time  HEENT: EOMI, eyes clear, moist mucus membranes, hearing intact  Respiratory: Bilateral chest rise   No audible wheezing found  Cardiovascular: Regular Rate and Rhythm  Abdomen: soft nontender/nondistended  Musculoskeletal: left lower extremity  · Skin intact, palpable knee effusion   · Tender to palpation over Medial and anterior knee  · Can perform striaght leg raise  · Stable to varus/valgus stress but with pain   · SILT s/s/sp/dp/t  · +ehl, fhl, ankle df/pf, knee flexion/ extension  · Leg soft and compressible, no pain with passive stretch  · +1 DP      Radiology:   I personally reviewed the films    CTA LLE- no obvious fractures   F/u pending xrays     _*_*_*_*_*_*_*_*_*_*_*_*_*_*_*_*_*_*_*_*_*_*_*_*_*_*_*_*_*_*_*_*_*_*_*_*_*_*_*_*_*    Assessment:  40 y o male s/p trauma to left knee with left knee pain and left knee effusion     Plan:   · Weight bearing as tolerated  left lower extremity  · Aspiration left knee, injection of lidocaine with re-examination  · PT  · Pain control  · Dispo: Ortho will follow      Donavan Abdi MD

## 2018-03-13 NOTE — PLAN OF CARE
DISCHARGE PLANNING - CARE MANAGEMENT     Discharge to post-acute care or home with appropriate resources Adequate for Discharge        Nutrition/Hydration-ADULT     Nutrient/Hydration intake appropriate for improving, restoring or maintaining nutritional needs Adequate for Discharge        Prexisting or High Potential for Compromised Skin Integrity     Skin integrity is maintained or improved Adequate for Discharge

## 2018-03-13 NOTE — H&P
H&P Exam - Trauma   Vani Motley 40 y o  male MRN: [id number]  Unit/Bed#: ED 26 Encounter: [id number]    Assessment/Plan   Trauma Alert: Other Not an alert - transfer from [facility] of Arrival: Ambulance  Trauma Team: Attending Dr Aniceto Weaver and Residents Tamy Rojas MD  Consultants: Orthopedics    Trauma Active Problems:   Left lower extremity numbness - rule out compartment syndrome   Left knee pain     Trauma Plan:    Lower extremity numbness - rule out compartment syndrome   Admit to Trauma team  Serial neurovascular checks q1 hour   Keep NPO incase pt needs sx later   IVF - [email or web address]  Will Striker pt's lower extremity if concern for compartment syndrome develop   Pain control - Oxycodone / dilaudid  Lovenox for DVT ppx     Knee Pain   Will consult Ortho   Robaxin for muscle spasm  Ice / elevation as needed for comfort   Knee immobilizer     Leukocytosis   Likely reactive   Repeat CBC in am     Nicotine dependence   Nicotine patch     Chief Complaint: My leg is killing me     History of Present Illness   HPI:  Vani Motley is a 40 y o  male with no past medical history  He presents as a transfer from [facility]  Earlier 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  Somehow the patient fell down a hill and managed to get his leg stuck in between a tree and a log  His left leg was wedged in between the log /tree from roughly 20 minutes until he and his friend were able to dislodge the log  The pt then proceeded to work the remainder of the day (6additional hours) even though he was unable to walk on his left lower extremity  He then decided to go to the ED for evaluation  There he complained that his left foot felt 'weird' and cold  His main complaint however was knee pain  He said he was able to bend his knee but was refraining from doing so b/c of the pain   CTA of his lower extremity was preformed and was normal  Due to concern for development of compartment syndrome he was transferred [facility] for a higher level of care  When assessed at [facility] his left foot was cold in comparison to his right however he had dopplerable DP & PT pulses b/l  ABIs were obtained (Right 1 17 Left 1 13) he denies calf pain with passive flexion / extension - but continued to c/o pain in his left knee  Medial and lateral compartments were soft with out any firmness noted       Mechanism:     Review of Systems   Constitutional: Negative  HENT: Negative  Eyes: Negative  Respiratory: Negative  Cardiovascular: Negative  Gastrointestinal: Negative  Endocrine: Negative  Genitourinary: Negative  Musculoskeletal: Positive for gait problem and joint swelling (Knee)  Slight numbness at toes on L lower extremity    Skin: Positive for color change (Erythema at L knee w/ some abrasions )  Allergic/Immunologic: Negative  Hematological: Negative  Psychiatric/Behavioral: Negative  All other systems reviewed and are negative  Historical Information       History reviewed  No pertinent past medical history  Past Surgical History:   Procedure Laterality Date    ROOT CANAL       Social History   History   Alcohol Use No     History   Drug Use No     History   Smoking Status    Current Every Day Smoker    Packs/day: 1 00   Smokeless Tobacco    Never Used       There is no immunization history on file for this patient    Last Tetanus: Unknown   Family History: Non-contributory        Meds/Allergies   all current active meds have been reviewed, current meds:   Current Facility-Administered Medications   Medication Dose Route Frequency    acetaminophen (TYLENOL) tablet 650 mg  650 mg Oral Q6H PRN    HYDROmorphone (DILAUDID) injection 0 5 mg  0 5 mg Intravenous Q4H PRN    lactated ringers infusion  125 mL/hr Intravenous Continuous    [START ON 3/13/2018] methocarbamol (ROBAXIN) tablet 500 mg  500 mg Oral Q6H [address]    ondansetron (ZOFRAN) injection 4 mg  4 mg Intravenous Q8H PRN    oxyCODONE (ROXICODONE) immediate release tablet 10 mg  10 mg Oral Q4H PRN    oxyCODONE (ROXICODONE) IR tablet 5 mg  5 mg Oral Q4H PRN    and PTA meds:   None       No Known Allergies      PHYSICAL EXAM    Objective   Vitals:   First set: Temperature: 98 2 °F (36 8 °C) (03/12/18 1945)  Pulse: 85 (03/12/18 1945)  Respirations: 19 (03/12/18 1945)  Blood Pressure: 158/74 (03/12/18 1945)    Primary Survey:   (A) Airway: Intact  (B) Breathing: CTA b/l   (C) Circulation: Pulses:     DP/PT signal B/L  (D) Disabliity:  GCS Total:  15  (E) Expose:  Completed    Secondary Survey: (Click on Physical Exam tab above)  Physical Exam   Constitutional: He is oriented to person, place, and time  He appears well-developed and well-nourished  Cardiovascular: Normal rate, regular rhythm, normal heart sounds and intact distal pulses  Exam reveals no gallop and no friction rub  No murmur heard  Pulmonary/Chest: Effort normal and breath sounds normal  No respiratory distress  He has no wheezes  He has no rales  He exhibits no tenderness  Abdominal: Soft  Bowel sounds are normal  He exhibits no distension and no mass  There is no tenderness  There is no rebound and no guarding  Musculoskeletal: He exhibits edema and tenderness  He exhibits no deformity  Left lower extremity   DP & PT signal present   Foot is cool   No calf tenderness to passive flexion / extension at ankle  L knee is erythematous, edematous and tender to touch   Overall calf compartments are soft with no firmness noted       Right - WNL   DP & PT signal present   Leg is warm and dry    Neurological: He is alert and oriented to person, place, and time  Skin: Skin is warm and dry  There is erythema  See musculoskeletal note    Nursing note and vitals reviewed  Invasive Devices     Peripheral Intravenous Line            Peripheral IV 03/12/18 Right Antecubital less than 1 day                Lab Results:   Results: I have personally reviewed pertinent reports     and I have personally reviewed pertinent films in PACS, BMP/CMP:   Lab Results   Component Value Date     03/12/2018    K 3 8 03/12/2018     03/12/2018    CO2 28 03/12/2018    ANIONGAP 8 03/12/2018    BUN 14 03/12/2018    CREATININE 0 80 03/12/2018    GLUCOSE 87 03/12/2018    CALCIUM 9 5 03/12/2018    EGFR 115 03/12/2018   , CBC:   Lab Results   Component Value Date    WBC 12 71 (H) 03/12/2018    HGB 15 0 03/12/2018    HCT 44 1 03/12/2018    MCV 87 03/12/2018     03/12/2018    MCH 29 5 03/12/2018    MCHC 34 0 03/12/2018    RDW 11 9 03/12/2018    MPV 9 2 03/12/2018    NRBC 0 03/12/2018   , Coagulation: No results found for: PT, INR, APTT, Lactate: No results found for: LACTATE, Amylase: No results found for: AMYLASE, Lipase: No results found for: LIPASE, AST: No results found for: AST, ALT: No results found for: ALT, Urinalysis: No results found for: COLORU, CLARITYU, SPECGRAV, PHUR, LEUKOCYTESUR, NITRITE, PROTEINUA, GLUCOSEU, KETONESU, BILIRUBINUR, BLOODU, CK:   Lab Results   Component Value Date    CKTOTAL 170 03/12/2018   , Troponin: No results found for: TROPONINI, EtOH: No results found for: ETOH, UDS: No components found for: RAPIDDRUGSCREEN, Pregnancy: No results found for: PREGTESTUR, ABG: No results found for: PHART, ENY8WNJ, PO2ART, XQK6WYT, D8OFZPYL, BEART, SOURCE and ISTAT: No components found for: VBG  Imaging/EKG Studies: Results: I have personally reviewed pertinent reports         CTA read faxed over & is WNL     Other Studies: None   Code Status: No Order  Advance Directive and Living Will:      Power of :    POLST:

## 2018-03-13 NOTE — ASSESSMENT & PLAN NOTE
Status post aspiration today  Pending CT left knee for occult fracture today  Outpatient MRI left knee  WBAT LLE in HKB unlocked  Analgesia, ice/ elevate for comfort   PT evaluation   Lovenox for DVT ppx

## 2018-03-13 NOTE — PROGRESS NOTES
Progress Note - Orthopedics   Raza Castillo 40 y o  male MRN: [id number]  [facility]      Subjective:    40 y o male s/p crush injury to left knee being re-examined after knee aspiration and intra-articular administration of local anesthetic  Labs:    0  Lab Value Date/Time   HCT 39 8 03/13/2018 0511   HCT 44 1 03/12/2018 1602   HCT 46 8 01/26/2017 1811   HGB 13 5 03/13/2018 0511   HGB 15 0 03/12/2018 1602   HGB 15 7 01/26/2017 1811   WBC 8 39 03/13/2018 0511   WBC 12 71 (H) 03/12/2018 1602   WBC 9 37 01/26/2017 1811       Meds:    Current Facility-Administered Medications:     acetaminophen (TYLENOL) tablet 650 mg, 650 mg, Oral, Q6H PRN, [employer]    enoxaparin (LOVENOX) subcutaneous injection 40 mg, 40 mg, Subcutaneous, Q24H [address], ZOOM TV , 40 mg at 03/13/18 0804    HYDROmorphone (DILAUDID) injection 0 5 mg, 0 5 mg, Intravenous, Q4H PRN, [employer], 0 5 mg at 03/13/18 1039    lactated ringers infusion, 125 mL/hr, Intravenous, Continuous, [employer], Last Rate: 125 mL/hr at 03/12/18 2133, 125 mL/hr at 03/12/18 2133    methocarbamol (ROBAXIN) tablet 500 mg, 500 mg, Oral, Q6H [address], [place] , 500 mg at 03/13/18 0612    nicotine (NICODERM CQ) 21 mg/24 hr TD 24 hr patch 21 mg, 21 mg, Transdermal, Daily, ZOOM TV , 21 mg at 03/13/18 0803    ondansetron (ZOFRAN) injection 4 mg, 4 mg, Intravenous, Q8H PRN, [place]    oxyCODONE (ROXICODONE) immediate release tablet 10 mg, 10 mg, Oral, Q4H PRN, [employer], 10 mg at 03/13/18 0803    oxyCODONE (ROXICODONE) IR tablet 5 mg, 5 mg, Oral, Q4H PRN, [employer]    Blood Culture:   No results found for: BLOODCX    Wound Culture:   No results found for: WOUNDCULT    Ins and Outs:  I/O last 24 hours: In: 0   Out: 1 [Urine:1]          Physical:  Vitals:    03/13/18 1119   BP: 129/74   Pulse: 71   Resp: 20   Temp: 98 1 °F (36 7 °C)   SpO2: 94%     Musculoskeletal: left Lower Extremity  · Skin grossly intact   Superficial abrasions over lateral aspect of knee  · TTP over medial and lateral joint lines  · SILT s/s/sp/dp/t  +fhl/ehl, +ankle dorsi/plantar flexion     · Positive Lachman's relative to contralateral side  · Stable to varus/valgus stress  · Extensor mechanism intact   · +PT pulse  · Hemarthrosis confirmed via aspiration (see procedure note)    Radiographs:  Review of left knee radiographs reveals no acute fracture      _*_*_*_*_*_*_*_*_*_*_*_*_*_*_*_*_*_*_*_*_*_*_*_*_*_*_*_*_*_*_*_*_*_*_*_*_*_*_*_*_*    Assessment:    40 y o male presenting with left knee hemarthrosis s/p crush injury     Plan:  · WBAT LLE in HKB unlocked  · Obtain CT of left knee to rule out occult fracture  · MRI as outpatient to assess for ligamentous injury  · PT/OT  · Pain control  · DVT ppx  · Dispo: Ortho will follow    Dat Cid MD

## 2018-03-13 NOTE — CASE MANAGEMENT
Initial Clinical Review    Admission: Date/Time/Statement: 3/12/18 @ 2111     Orders Placed This Encounter   Procedures    Inpatient Admission     Standing Status:   Standing     Number of Occurrences:   1     Order Specific Question:   Admitting Physician     Answer:   Estee Garcia [[id number]]     Order Specific Question:   Level of Care     Answer:   Med Surg [[id number]]     Order Specific Question:   Bed request comments     Answer:   Step down 2     Order Specific Question:   Estimated length of stay     Answer:   More than 2 Midnights     Order Specific Question:   Certification     Answer:   I certify that inpatient services are medically necessary for this patient for a duration of greater than two midnights  See H&P and MD Progress Notes for additional information about the patient's course of treatment  ED: Date/Time/Mode of Arrival: 3/12 Transfer from [address] [place] [address] ED to [facility]    ED Arrival Information     Expected [address] of Arrival Escorted By Service Admission Type    3/12/2018 18:51 3/12/2018 19:39 Immediate Ambulance [facility]) Trauma Urgent    Arrival Complaint    trauma transfer from [place]           Chief Complaint:   Chief Complaint   Patient presents with    Leg Injury - Major     Patient was cutting down a tree slid down hill was pinned against another tree  Had to cut tree to free lower right leg  Patient arrived to this ER with cold lower right leg  History of Illness: 40 y o  male with no past medical history  He presents as a transfer from [facility]  Earlier 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  Somehow the patient fell down a hill and managed to get his leg stuck in between a tree and a log  His left leg was wedged in between the log /tree from roughly 20 minutes until he and his friend were able to dislodge the log   The pt then proceeded to work the remainder of the day (6 additional hours) even though he was unable to walk on his left lower extremity  He then decided to go to the ED for evaluation  There he complained that his left foot felt 'weird' and cold  His main complaint however was knee pain  He said he was able to bend his knee but was refraining from doing so b/c of the pain  CTA of his lower extremity was preformed and was normal  Due to concern for development of compartment syndrome he was transferred to [facility] for a higher level of care  When assessed at [facility] his left foot was cold in comparison to his right however he had dopplerable DP & PT pulses b/l  ABIs were obtained (Right 1 17 Left 1 13) he denies calf pain with passive flexion / extension - but continued to c/o pain in his left knee  Medial and lateral compartments were soft with out any firmness noted     ED Vital Signs:   ED Triage Vitals   Temperature Pulse Respirations Blood Pressure SpO2   03/12/18 1945 03/12/18 1945 03/12/18 1945 03/12/18 1945 03/12/18 1940   98 2 °F (36 8 °C) 85 19 158/74 99 %      Temp Source Heart Rate Source Patient Position - Orthostatic VS BP Location FiO2 (%)   03/12/18 1945 03/12/18 1945 03/12/18 1945 03/13/18 0100 --   Oral Monitor Sitting Right arm       Pain Score       03/12/18 1954       5        Wt Readings from Last 1 Encounters:   03/13/18 109 kg (240 lb 4 8 oz)       Vital Signs (abnormal): WNL    Abnormal Labs:    03/12/18 1602    WBC 4 31 - 10 16 Thousand/uL 12 71       Diagnostic Test Results: Xray Left Knee - No acute osseous abnormality  Large joint effusion      ED Treatment:   Medication Administration from 03/12/2018 1851 to 03/12/2018 2356       Date/Time Order Dose Route Action     03/12/2018 2021 HYDROmorphone (DILAUDID) injection 0 5 mg 0 5 mg Intravenous Given     03/12/2018 2133 lactated ringers infusion 125 mL/hr Intravenous New Bag     03/12/2018 2347 oxyCODONE (ROXICODONE) immediate release tablet 10 mg 10 mg Oral Given     03/12/2018 2133 nicotine (NICODERM CQ) 21 mg/24 hr TD 24 hr patch 21 mg 21 mg Transdermal Medication Applied          Past Medical/Surgical History: Active Ambulatory Problems     Diagnosis Date Noted    No Active Ambulatory Problems     Resolved Ambulatory Problems     Diagnosis Date Noted    No Resolved Ambulatory Problems     No Additional Past Medical History       Admitting Diagnosis: Leg injury [S89 90XA]  Acute pain of left knee [M25 562]    Age/Sex: 40 y o  male    Assessment/Plan:   Trauma Alert:  Other Not an alert - transfer from [facility] of Arrival: Ambulance    Trauma Active Problems:   Left lower extremity numbness - rule out compartment syndrome   Left knee pain      Trauma Plan:  Lower extremity numbness - rule out compartment syndrome   Admit to Trauma team  Serial neurovascular checks q1 hour   Keep NPO incase pt needs sx later   IVF - [email or web address]  Will Striker pt's lower extremity if concern for compartment syndrome develop   Pain control - Oxycodone / dilaudid  Lovenox for DVT ppx      Knee Pain   Will consult Ortho   Robaxin for muscle spasm  Ice / elevation as needed for comfort   Knee immobilizer      Leukocytosis   Likely reactive   Repeat CBC in am      Nicotine dependence   Nicotine patch       Admission Orders:  NPO; Sips with meds  Orthopedic Surgery cons  PT/OT eval and treat    Scheduled Meds:   Current Facility-Administered Medications:  acetaminophen 650 mg Oral Q6H PRN    enoxaparin 40 mg Subcutaneous Q24H [address]    methocarbamol 500 mg Oral Q6H [address]    nicotine 21 mg Transdermal Daily      Continuous Infusions:   lactated ringers 125 mL/hr Last Rate: 125 mL/hr (03/12/18 2133)     PRN Meds:   acetaminophen    HYDROmorphone Iv x3    ondansetron    oxyCODONE po x3    ----------------------------------------------------------------------------------------------------------    3/13 Orthopedic cons:  Assessment:  37 y o male s/p trauma to left knee with left knee pain and left knee effusion      Plan:   · Weight bearing as tolerated  left lower extremity  · Aspiration left knee, injection of lidocaine with re-examination  · PT  · Pain control  · Dispo: Ortho will follow    --------------------------------------------------------------------------------------------    3/13 Physician progress notes:  Summary of Diagnosed Injuries:          * Injury of left lower extremity   Assessment & Plan     Continue q 2 hour neurovascular checks to r/out development of compartment syndrome    Ice/ elevate for comfort   PT/OT evaluation   Lovenox for DVT ppx           Acute knee pain - left    Assessment & Plan     Follow up w/ ortho - plan to aspirate knee & inject lidocaine for re-examination    Ice/ elevate for comfort   PT/OT evaluation   Lovenox for DVT ppx           Leukocytosis   Assessment & Plan     Resolved - Was likely reactive & due to his trauma   No need to recheck unless pt spikes a fever           Nicotine dependence   Assessment & Plan     Continue nicotine patches daily

## 2018-03-14 NOTE — SOCIAL WORK
Referral to [facility] for [employer]   Pt left the hospital last evening without one, but CM was never informed pt required this equipment prior to d/c

## 2018-03-17 ENCOUNTER — HOSPITAL ENCOUNTER (EMERGENCY)
Facility: HOSPITAL | Age: 38
Discharge: HOME | End: 2018-03-17
Attending: EMERGENCY MEDICINE | Admitting: EMERGENCY MEDICINE
Payer: COMMERCIAL

## 2018-03-17 VITALS
OXYGEN SATURATION: 100 % | TEMPERATURE: 98.1 F | RESPIRATION RATE: 16 BRPM | HEART RATE: 80 BPM | DIASTOLIC BLOOD PRESSURE: 67 MMHG | SYSTOLIC BLOOD PRESSURE: 139 MMHG

## 2018-03-17 DIAGNOSIS — S89.92XA LEFT KNEE INJURY: Primary | ICD-10-CM

## 2018-03-17 PROCEDURE — 99283 EMERGENCY DEPT VISIT LOW MDM: CPT

## 2018-03-17 PROCEDURE — 96372 THER/PROPH/DIAG INJ SC/IM: CPT

## 2018-03-17 RX ORDER — NAPROXEN 500 MG/1
500 TABLET ORAL 2 TIMES DAILY WITH MEALS
Qty: 30 TABLET | Refills: 0 | Status: SHIPPED | OUTPATIENT
Start: 2018-03-17 | End: 2018-12-02

## 2018-03-17 RX ORDER — KETOROLAC TROMETHAMINE 30 MG/ML
15 INJECTION, SOLUTION INTRAMUSCULAR; INTRAVENOUS ONCE
Status: COMPLETED | OUTPATIENT
Start: 2018-03-17 | End: 2018-03-17

## 2018-03-17 RX ORDER — OXYCODONE HYDROCHLORIDE 10 MG/1
10 TABLET ORAL EVERY 4 HOURS PRN
Qty: 15 TABLET | Refills: 0 | Status: SHIPPED | OUTPATIENT
Start: 2018-03-17 | End: 2018-03-27

## 2018-03-17 RX ADMIN — KETOROLAC TROMETHAMINE 15 MG: 30 INJECTION, SOLUTION INTRAMUSCULAR at 14:48

## 2018-03-17 NOTE — ED PROVIDER NOTES
History  Chief Complaint   Patient presents with    Leg Injury     pt seen here monday after being hit in the leg by a tree  pt was transfered to [place]  pt was supposed to be seen within a week  Pt doesnt have an appt until april  Went to [facility] and they told patient to come here  HPI      78-year-old male presents for left leg pain  The patient was a trauma admission to [place] last week after a crush injury to his left knee  Was told he probably has a ligamentous injury and discharged home in a knee immobilizer  Patient reports that his pain is the same he is able walk on it but he went to orthopedics at [facility] who told him that he should follow up with [facility]  Patient states that he was supposed to be seen yesterday but when he called for an appointment they gave an appointment in April in [place]  Patient denies any worsening pain numbness tingling weakness  No other associated symptoms no other modifying factors  On exam the patient has some minimal swelling to the knee joint  There is tenderness  On both joint spaces medial and lateral   Neurovascular intact distally  Compartments are soft    Assessment plan:  Knee injury  Will attempt to contact Orthopedics for course of follow-up  Continue pain medications  Advised anti-inflammatories  Prior to Admission Medications   Prescriptions Last Dose Informant Patient Reported? Taking? methocarbamol (ROBAXIN) 500 mg tablet   No No   Sig: Take 1 tablet (500 mg total) by mouth every 6 (six) hours   oxyCODONE (ROXICODONE) 5 mg immediate release tablet   No No   Sig: Take 1 tablet (5 mg total) by mouth every 4 (four) hours as needed for moderate pain for up to 10 days Max Daily Amount: 30 mg      Facility-Administered Medications: None       History reviewed  No pertinent past medical history      Past Surgical History:   Procedure Laterality Date    ROOT CANAL         History reviewed  No pertinent family history  I have reviewed and agree with the history as documented  Social History   Substance Use Topics    Smoking status: Current Every Day Smoker     Packs/day: 1 00    Smokeless tobacco: Never Used    Alcohol use No        Review of Systems   Constitutional: Negative for chills, fatigue and fever  Eyes: Negative for photophobia and visual disturbance  Respiratory: Negative for cough and shortness of breath  Cardiovascular: Negative for chest pain, palpitations and leg swelling  Gastrointestinal: Negative for diarrhea, nausea and vomiting  Endocrine: Negative for polydipsia and polyuria  Genitourinary: Negative for decreased urine volume, difficulty urinating, dysuria and frequency  Musculoskeletal: Positive for joint swelling  Negative for back pain, neck pain and neck stiffness  Skin: Negative for color change and rash  Allergic/Immunologic: Negative for environmental allergies and immunocompromised state  Neurological: Negative for dizziness and headaches  Hematological: Negative for adenopathy  Does not bruise/bleed easily  Psychiatric/Behavioral: Negative for dysphoric mood  The patient is not nervous/anxious  Physical Exam  ED Triage Vitals   Temperature Pulse Respirations Blood Pressure SpO2   03/17/18 1229 03/17/18 1229 03/17/18 1229 03/17/18 1229 03/17/18 1229   98 1 °F (36 7 °C) 80 16 139/67 100 %      Temp Source Heart Rate Source Patient Position - Orthostatic VS BP Location FiO2 (%)   03/17/18 1229 03/17/18 1229 03/17/18 1229 03/17/18 1229 --   Oral Monitor Sitting Left arm       Pain Score       03/17/18 1448       Worst Possible Pain           Orthostatic Vital Signs  Vitals:    03/17/18 1229   BP: 139/67   Pulse: 80   Patient Position - Orthostatic VS: Sitting       Physical Exam   Constitutional: He is oriented to person, place, and time  He appears well-developed  HENT:   Head: Normocephalic and atraumatic     Right Ear: External ear normal    Left Ear: External ear normal    Mouth/Throat: Oropharynx is clear and moist    Eyes: Conjunctivae and EOM are normal  Pupils are equal, round, and reactive to light  Neck: Normal range of motion  Neck supple  No JVD present  No thyromegaly present  Cardiovascular: Normal rate, regular rhythm and normal heart sounds  Exam reveals no gallop and no friction rub  No murmur heard  Pulmonary/Chest: Effort normal and breath sounds normal  No respiratory distress  He has no wheezes  He has no rales  Abdominal: Soft  Bowel sounds are normal  He exhibits no distension  There is no rebound and no guarding  Musculoskeletal: Normal range of motion  He exhibits tenderness (left knee joint)  He exhibits no edema  Lymphadenopathy:     He has no cervical adenopathy  Neurological: He is alert and oriented to person, place, and time  No cranial nerve deficit  Skin: Skin is warm  Psychiatric: He has a normal mood and affect  His behavior is normal    Nursing note and vitals reviewed        ED Medications  Medications   ketorolac (TORADOL) injection 15 mg (15 mg Intramuscular Given 3/17/18 1448)       Diagnostic Studies  Results Reviewed     None                 No orders to display              Procedures  Procedures       Phone Contacts  ED Phone Contact    ED Course  ED Course                                MDM  Number of Diagnoses or Management Options  Left knee injury: new and requires workup     Amount and/or Complexity of Data Reviewed  Review and summarize past medical records: yes      CritCare Time    Disposition  Final diagnoses:   Left knee injury     Time reflects when diagnosis was documented in both MDM as applicable and the Disposition within this note     Time User Action Codes Description Comment    3/17/2018  2:41 PM Kal Tran Add [[id number] [id number]] Left knee injury       ED Disposition     ED Disposition Condition Comment    Discharge  Hildy Kendra discharge to home/self care     Condition at discharge: Good        Follow-up Information     Follow up With Specialties Details Why Contact Info    Trey Seymour MD Orthopedic Surgery Call in 2 days  [address]  [phone]          Discharge Medication List as of 3/17/2018  3:03 PM      START taking these medications    Details   naproxen (NAPROSYN) 500 mg tablet Take 1 tablet (500 mg total) by mouth 2 (two) times a day with meals, Starting Sat 3/17/2018, Print      !! oxyCODONE (ROXICODONE) 10 MG TABS Take 1 tablet (10 mg total) by mouth every 4 (four) hours as needed for moderate pain for up to 10 days Max Daily Amount: 60 mg, Starting Sat 3/17/2018, Until Tue 3/27/2018, Print       !! - Potential duplicate medications found  Please discuss with provider  CONTINUE these medications which have NOT CHANGED    Details   methocarbamol (ROBAXIN) 500 mg tablet Take 1 tablet (500 mg total) by mouth every 6 (six) hours, Starting Tue 3/13/2018, Print      !! oxyCODONE (ROXICODONE) 5 mg immediate release tablet Take 1 tablet (5 mg total) by mouth every 4 (four) hours as needed for moderate pain for up to 10 days Max Daily Amount: 30 mg, Starting Tue 3/13/2018, Until Fri 3/23/2018, Print       !! - Potential duplicate medications found  Please discuss with provider  No discharge procedures on file      ED Provider  Electronically Signed by           Nikki Fry DO  03/20/18 0691

## 2018-03-17 NOTE — DISCHARGE INSTRUCTIONS
ACL Injury   WHAT YOU NEED TO KNOW:   An anterior cruciate ligament (ACL) injury is a partial or complete tear of the ACL  The ACL is a ligament in your knee that connects the tibia (shin bone) to the femur (thigh bone)  Ligaments are strong tissues that connect bones  The ACL stops the tibia from sliding too far forward and keeps the knee stable  DISCHARGE INSTRUCTIONS:   Return to the emergency department if:   · Your toes are cold or numb  · Your knee becomes more weak or unstable  · Your pain has increased, even after you take your pain medicine  · Your swelling has increased  Contact your healthcare provider if:   · You have a fever  · Your symptoms are not getting better  · You have questions or concerns about your condition or care  Medicines: You may need any of the following:  · NSAIDs , such as ibuprofen, help decrease swelling, pain, and fever  This medicine is available with or without a doctor's order  NSAIDs can cause stomach bleeding or kidney problems in certain people  If you take blood thinner medicine, always ask your healthcare provider if NSAIDs are safe for you  Always read the medicine label and follow directions  · Acetaminophen  decreases pain and fever  It is available without a doctor's order  Ask how much to take and how often to take it  Follow directions  Read the labels of all other medicines you are using to see if they also contain acetaminophen, or ask your doctor or pharmacist  Acetaminophen can cause liver damage if not taken correctly  Do not use more than 4 grams (4,000 milligrams) total of acetaminophen in one day  · Prescription pain medicine  may be given  Ask your healthcare provider how to take this medicine safely  Some prescription pain medicines contain acetaminophen  Do not take other medicines that contain acetaminophen without talking to your healthcare provider  Too much acetaminophen may cause liver damage   Prescription pain medicine may cause constipation  Ask your healthcare provider how to prevent or treat constipation  · Take your medicine as directed  Contact your healthcare provider if you think your medicine is not helping or if you have side effects  Tell him or her if you are allergic to any medicine  Keep a list of the medicines, vitamins, and herbs you take  Include the amounts, and when and why you take them  Bring the list or the pill bottles to follow-up visits  Carry your medicine list with you in case of an emergency  Manage your ACL injury:   · Rest  your joint so that it can heal  Return to normal activities as directed  You may not be able to play certain sports until your knee heals  Talk to your healthcare provider about sports you currently play  You may need to make a safe plan to start playing the sport again  · Ice  helps decrease swelling and pain  Ice may also help prevent tissue damage  Use an ice pack, or put crushed ice in a plastic bag  Cover the ice pack with a towel and place it on your injured ligament for 15 to 20 minutes every hour  Use the ice for as long as directed  · Compression  provides support and helps decrease swelling and movement so your joint can heal  Ask your healthcare provider if you should wrap an elastic bandage around your injured ligament  · Elevate  your injured area raised above the level of your heart as often as you can  This will help decrease or limit swelling  Elevate the injured area by resting it on pillows  · Use support devices  as directed  A knee brace may be used to limit movement and protect your child's knee  Your child may need to use crutches to help decrease pain as he or she moves around  · Go to physical therapy  if directed  Physical therapy may be used to teach your child exercises to help improve movement and strength, and to decrease pain  The exercises can also help increase the range of motion in your child's knee    Follow up with your healthcare provider as directed:  Write down your questions so you remember to ask them during your visits  © 2017 [employer] Information is for End User's use only and may not be sold, redistributed or otherwise used for commercial purposes  All illustrations and images included in CareNotes® are the copyrighted property of A D A M [employer] Inc  or Darrell Pacheco  The above information is an  only  It is not intended as medical advice for individual conditions or treatments  Talk to your doctor, nurse or pharmacist before following any medical regimen to see if it is safe and effective for you

## 2018-03-26 ENCOUNTER — OFFICE VISIT (OUTPATIENT)
Dept: OBGYN CLINIC | Facility: HOSPITAL | Age: 38
End: 2018-03-26
Payer: COMMERCIAL

## 2018-03-26 VITALS
SYSTOLIC BLOOD PRESSURE: 124 MMHG | HEART RATE: 85 BPM | RESPIRATION RATE: 20 BRPM | BODY MASS INDEX: 34.66 KG/M2 | HEIGHT: 69 IN | WEIGHT: 234 LBS | DIASTOLIC BLOOD PRESSURE: 86 MMHG

## 2018-03-26 DIAGNOSIS — M25.562 ACUTE PAIN OF LEFT KNEE: Primary | ICD-10-CM

## 2018-03-26 DIAGNOSIS — S83.412A TEAR OF MCL (MEDIAL COLLATERAL LIGAMENT) OF KNEE, LEFT, INITIAL ENCOUNTER: ICD-10-CM

## 2018-03-26 PROCEDURE — 99214 OFFICE O/P EST MOD 30 MIN: CPT | Performed by: ORTHOPAEDIC SURGERY

## 2018-03-26 NOTE — PROGRESS NOTES
Assessment/Plan:  Assessment/Plan   Diagnoses and all orders for this visit:    Acute pain of left knee    Tear of MCL (medial collateral ligament) of knee, left, initial encounter        Discussion and plan:    The patient has at least a grade 2 sprain of his MCL and has been transitioned from a long hinged knee brace into a short hinged knee brace for support, I will also obtain an MRI scan of the left knee to better understand the severity of the MCL injury and if there is any associated collateral ligament or meniscal pathology, I would also like to see that possible impaction of the lateral tibial plateau an MRI will allow me to do that  Subjective:   Patient ID: Micha Claros is a 40 y o  male  The patient presents with a chief complaint of left knee pain  He injured his knee when a tree stuck him and pinned his knee in a valgus type position for 30 minutes or so, he was seen as a trauma patient here at [facility] where he underwent aspiration of a heme arthrosis of the knee and imaging including a CT scan, a CT angio and plain x-rays showing no acute bony injury  He has been wearing a long hinged knee brace and states it bothers him on the medial side he has sharp pain which is constant, worse with kneeling and bending the knee, 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  When he is out of the brace he feels the knee is unstable  The following portions of the patient's history were reviewed and updated as appropriate: allergies, current medications, past family history, past medical history, past social history, past surgical history and problem list     Review of Systems   Constitutional: Negative for chills and fever  HENT: Negative for hearing loss  Eyes: Negative for visual disturbance  Respiratory: Negative for shortness of breath  Cardiovascular: Negative for chest pain  Gastrointestinal: Negative for abdominal pain     Musculoskeletal: As reviewed in the HPI   Skin: Negative for rash  Neurological:        As reviewed in the HPI   Psychiatric/Behavioral: Negative for agitation  Objective:  Left Knee Exam     Tenderness   The patient is experiencing tenderness in the medial joint line  Range of Motion   The patient has normal left knee ROM  Tests   Glo:  Medial - positive Lateral - negative  Lachman:  Anterior - negative      Drawer:       Posterior - negative  Varus: negative  Valgus: positive  Patellar Apprehension: negative    Other   Erythema: absent  Scars: absent  Sensation: normal  Pulse: present  Swelling: none  Effusion: no effusion present          Observations   Left Knee   Negative for effusion  Physical Exam   Constitutional: He is oriented to person, place, and time  He appears well-developed and well-nourished  HENT:   Head: Normocephalic and atraumatic  Neck: Normal range of motion  Neck supple  Cardiovascular: Normal rate and regular rhythm  Pulmonary/Chest: Effort normal  No respiratory distress  Abdominal: Soft  He exhibits no distension  Musculoskeletal:        Left knee: He exhibits no effusion  Neurological: He is alert and oriented to person, place, and time  Skin: Skin is warm and dry  Psychiatric: He has a normal mood and affect  His behavior is normal    Nursing note and vitals reviewed  I have personally reviewed pertinent films in PACS and my interpretation is as follows      Two views left knee plain x-rays show no obvious fracture    CT scan left knee shows no obvious fracture although on the coronal images series 500, image 45 feel there may be a nondisplaced impaction of the lateral tibial plateau, this is not appreciated by the radiologist

## 2018-12-02 ENCOUNTER — APPOINTMENT (EMERGENCY)
Dept: RADIOLOGY | Facility: HOSPITAL | Age: 38
End: 2018-12-02
Payer: COMMERCIAL

## 2018-12-02 ENCOUNTER — HOSPITAL ENCOUNTER (EMERGENCY)
Facility: HOSPITAL | Age: 38
Discharge: HOME | End: 2018-12-02
Attending: EMERGENCY MEDICINE | Admitting: EMERGENCY MEDICINE
Payer: COMMERCIAL

## 2018-12-02 VITALS
SYSTOLIC BLOOD PRESSURE: 124 MMHG | DIASTOLIC BLOOD PRESSURE: 84 MMHG | WEIGHT: 230 LBS | HEIGHT: 70 IN | BODY MASS INDEX: 32.93 KG/M2 | HEART RATE: 69 BPM | TEMPERATURE: 98.6 F | RESPIRATION RATE: 18 BRPM | OXYGEN SATURATION: 98 %

## 2018-12-02 DIAGNOSIS — M25.572 ANKLE PAIN, LEFT: Primary | ICD-10-CM

## 2018-12-02 DIAGNOSIS — M10.9 GOUT: ICD-10-CM

## 2018-12-02 PROCEDURE — 99283 EMERGENCY DEPT VISIT LOW MDM: CPT

## 2018-12-02 PROCEDURE — 73610 X-RAY EXAM OF ANKLE: CPT

## 2018-12-02 RX ORDER — INDOMETHACIN 50 MG/1
50 CAPSULE ORAL
Qty: 21 CAPSULE | Refills: 0 | Status: SHIPPED | OUTPATIENT
Start: 2018-12-02 | End: 2020-07-17 | Stop reason: ALTCHOICE

## 2018-12-02 RX ORDER — INDOMETHACIN 25 MG/1
50 CAPSULE ORAL ONCE
Status: COMPLETED | OUTPATIENT
Start: 2018-12-02 | End: 2018-12-02

## 2018-12-02 RX ORDER — IBUPROFEN 400 MG/1
400 TABLET ORAL ONCE
Status: COMPLETED | OUTPATIENT
Start: 2018-12-02 | End: 2018-12-02

## 2018-12-02 RX ADMIN — IBUPROFEN 400 MG: 400 TABLET ORAL at 18:55

## 2018-12-02 RX ADMIN — INDOMETHACIN 50 MG: 25 CAPSULE ORAL at 19:35

## 2018-12-02 NOTE — ED PROVIDER NOTES
History  Chief Complaint   Patient presents with    Ankle Pain     Pt c/o left ankle pain with unknown HERB  43-year-old male presents with acute onset of left ankle pain that occurred today after work  Patient states that he worked all day, he was in work boots, went up and down ladders, did not have any pain in his ankle into the got home and took off his boots  Now presenting with left ankle pain, atraumatic  He has no evidence of trauma to the area  No skin discoloration over the injury site  He has tenderness to the medial malleolus, lateral malleolus and posterior aspect of the ankle joint  No history of similar in the past to either this joint or any other joint  No history of gout  None       History reviewed  No pertinent past medical history  Past Surgical History:   Procedure Laterality Date    ROOT CANAL         History reviewed  No pertinent family history  I have reviewed and agree with the history as documented  Social History   Substance Use Topics    Smoking status: Current Every Day Smoker     Packs/day: 1 00    Smokeless tobacco: Never Used    Alcohol use No        Review of Systems   Constitutional: Negative for chills and fever  Respiratory: Negative for chest tightness and shortness of breath  Cardiovascular: Negative for chest pain and leg swelling  Gastrointestinal: Negative for abdominal pain, nausea and vomiting  Musculoskeletal: Positive for joint swelling (Mild swelling of left ankle)  Negative for back pain and neck pain  Left ankle pain   Skin: Negative for rash and wound  Neurological: Negative for dizziness, weakness, numbness and headaches  Physical Exam  Physical Exam   Constitutional: He is oriented to person, place, and time  He appears well-developed and well-nourished  No distress  HENT:   Head: Normocephalic and atraumatic     Mouth/Throat: Oropharynx is clear and moist    Eyes: Conjunctivae and EOM are normal    Neck: Normal range of motion  Neck supple  Pulmonary/Chest: Effort normal and breath sounds normal  No respiratory distress  Musculoskeletal: He exhibits tenderness (Swelling to the left ankle  Tenderness to the ankle joint  No evidence of trauma  Patient has tenderness to the medial malleolus, lateral malleolus, posterior aspect of the ankle joint  )  Neurological: He is alert and oriented to person, place, and time  No cranial nerve deficit or sensory deficit  Neurovascularly intact distal to the injury  Skin: Skin is warm and dry  No rash noted  He is not diaphoretic  No pallor  No discoloration of the skin over the ankle joint  Psychiatric: He has a normal mood and affect  His behavior is normal    Vitals reviewed  Vital Signs  ED Triage Vitals [12/02/18 1838]   Temperature Pulse Respirations Blood Pressure SpO2   98 6 °F (37 °C) 80 20 131/85 98 %      Temp Source Heart Rate Source Patient Position - Orthostatic VS BP Location FiO2 (%)   Oral Monitor Sitting Right arm --      Pain Score       Worst Possible Pain           Vitals:    12/02/18 1838   BP: 131/85   Pulse: 80   Patient Position - Orthostatic VS: Sitting       Visual Acuity      ED Medications  Medications   ibuprofen (MOTRIN) tablet 400 mg (400 mg Oral Given 12/2/18 1855)   indomethacin (INDOCIN) capsule 50 mg (50 mg Oral Given 12/2/18 1935)       Diagnostic Studies  Results Reviewed     None                 XR ankle 3+ views LEFT   ED Interpretation by Raisa Dickson DO (12/02 1939)   No acute osseous injury                 Procedures  Procedures       Phone Contacts  ED Phone Contact    ED Course                               MDM  Number of Diagnoses or Management Options  Diagnosis management comments: Ankle pain with a normal x-ray  Likely gout  Patient will be treated with indomethacin, given an Ace wrap, crutches, and advised follow-up with Orthopedics         Amount and/or Complexity of Data Reviewed  Tests in the radiology section of CPT®: ordered and reviewed      CritCare Time    Disposition  Final diagnoses: Ankle pain, left   Gout     Time reflects when diagnosis was documented in both MDM as applicable and the Disposition within this note     Time User Action Codes Description Comment    12/2/2018  7:48 PM Scar Krishnamurthy Add [M25 572] Ankle pain, left     12/2/2018  7:49 PM Scar Krishnamurthy Add [M10 9] Gout       ED Disposition     ED Disposition Condition Comment    Discharge  [facility] discharge to home/self care  Condition at discharge: Good        Follow-up Information     Follow up With Specialties Details Why [address] [place] [address] Emergency Department Emergency Medicine Go to If symptoms worsen: fever, chills, skin discoloration, redness, severe pain, etc [facility] ED, [address], [place], [address] [facility], [place] Orthopedic Surgery Schedule an appointment as soon as possible for a visit For follow up to ensure improvement, and for further testing and treatment as needed [address] [address]  [phone]             Patient's Medications   Discharge Prescriptions    INDOMETHACIN (INDOCIN) 50 MG CAPSULE    Take 1 capsule (50 mg total) by mouth 3 (three) times a day with meals for 7 days As needed for pain control       Start Date: 12/2/2018 End Date: 12/9/2018       Order Dose: 50 mg       Quantity: 21 capsule    Refills: 0     No discharge procedures on file      ED Provider  Electronically Signed by           Rudy Lenz DO  12/02/18 1953

## 2018-12-03 NOTE — ED NOTES
Discharge instructions reviewed with patient  Pt verbalized understanding and denied any questions at this time       Jennifer Hoffman RN  12/02/18 2007

## 2018-12-03 NOTE — DISCHARGE INSTRUCTIONS
Use the Ace wrap to apply compression and for assistance with the pain  Use the indomethacin for pain control as well  Please follow up with Orthopedics  Do not bear weight if it is hurting when you do  Use the crutches he can stay off of the foot as needed  Gout   WHAT YOU NEED TO KNOW:   Gout is a form of arthritis that causes severe joint pain, redness, swelling, and stiffness  Acute gout pain starts suddenly, gets worse quickly, and stops on its own  Acute gout can become chronic and cause permanent damage to the joints  DISCHARGE INSTRUCTIONS:   Return to the emergency department if:   · You have severe pain in one or more of your joints that you cannot tolerate  · You have a fever or redness that spreads beyond the joint area  Contact your healthcare provider if:   · You have new symptoms, such as a rash, after you start gout treatment  · Your joint pain and swelling do not go away, even after treatment  · You are not urinating as much or as often as you usually do  · You have trouble taking your gout medicines  · You have questions or concerns about your condition or care  Medicines: You may need any of the following:  · Prescription pain medicine  may be given  Ask your healthcare provider how to take this medicine safely  Some prescription pain medicines contain acetaminophen  Do not take other medicines that contain acetaminophen without talking to your healthcare provider  Too much acetaminophen may cause liver damage  Prescription pain medicine may cause constipation  Ask your healthcare provider how to prevent or treat constipation  · NSAIDs , such as ibuprofen, help decrease swelling, pain, and fever  This medicine is available with or without a doctor's order  NSAIDs can cause stomach bleeding or kidney problems in certain people  If you take blood thinner medicine, always ask your healthcare provider if NSAIDs are safe for you   Always read the medicine label and follow directions  · Gout medicine  decreases joint pain and swelling  It may also be given to prevent new gout attacks  · Steroids  reduce inflammation and can help your joint stiffness and pain during gout attacks  · Uric acid medicine  may be given to reduce the amount of uric acid your body makes  Some medicines may help you pass more uric acid when you urinate  · Take your medicine as directed  Contact your healthcare provider if you think your medicine is not helping or if you have side effects  Tell him or her if you are allergic to any medicine  Keep a list of the medicines, vitamins, and herbs you take  Include the amounts, and when and why you take them  Bring the list or the pill bottles to follow-up visits  Carry your medicine list with you in case of an emergency  Follow up with your healthcare provider as directed:  Write down your questions so you remember to ask them during your visits  Manage gout:   · Rest your painful joint so it can heal   Your healthcare provider may recommend crutches or a walker if the affected joint is in a leg  · Apply ice to your joint  Ice decreases pain and swelling  Use an ice pack, or put crushed ice in a plastic bag  Cover the ice pack or bag with a towel before you apply it to your painful joint  Apply ice for 15 to 20 minutes every hour, or as directed  · Elevate your joint  Elevation helps reduce swelling and pain  Raise your joint above the level of your heart as often as you can  Prop your painful joint on pillows to keep it above your heart comfortably  · Go to physical therapy if directed  A physical therapist can teach you exercises to improve flexibility and range of motion  Prevent gout attacks:   · Do not eat high-purine foods  These foods include meats, seafood, asparagus, spinach, cauliflower, and some types of beans  Healthcare providers may tell you to eat more low-fat milk products, such as yogurt   Milk products may decrease your risk for gout attacks  Vitamin C and coffee may also help  Your healthcare provider or dietitian can help you create a meal plan  · Drink more liquids as directed  Liquids such as water help remove uric acid from your body  Ask how much liquid to drink each day and which liquids are best for you  · Manage your weight  Weight loss may decrease the amount of uric acid in your body  Exercise can help you lose weight  Talk to your healthcare provider about the best exercises for you  · Control your blood sugar level if you have diabetes  Keep your blood sugar level in a normal range  This can help prevent gout attacks  · Limit or do not drink alcohol as directed  Alcohol can trigger a gout attack  Alcohol also increases your risk for dehydration  Ask your healthcare provider if alcohol is safe for you  © 2017 [facility] Information is for End User's use only and may not be sold, redistributed or otherwise used for commercial purposes  All illustrations and images included in CareNotes® are the copyrighted property of A D A M [employer] Inc  or Darrell T[employer]  The above information is an  only  It is not intended as medical advice for individual conditions or treatments  Talk to your doctor, nurse or pharmacist before following any medical regimen to see if it is safe and effective for you  RICE Therapy   WHAT YOU NEED TO KNOW:   What is RICE therapy? RICE therapy is a 4-step process used to reduce swelling and pain from an injury  RICE stands for rest, ice, compression, and elevation  RICE should be done within the first 24 to 48 hours after an injury  How do I use RICE therapy? · Rest  the injured area so that it can heal  You may need to avoid putting any weight on your injury for at least 48 hours  Return to normal activities as directed  · Ice  the injury for 20 minutes every 4 hours, or as directed   Use an ice pack, or put crushed ice in a plastic bag  Cover it with a towel to protect your skin  Ice helps prevent tissue damage and decreases swelling and pain  · Compress  the injury with an elastic bandage, air cast, special boot, or splint to reduce swelling  Ask your healthcare provider which compression device to use, and how tight it should be  · Elevate  the injured area above the level of your heart as often as you can  This will help decrease swelling and pain  If possible, prop the injured area on pillows or blankets to keep it elevated comfortably  When should I contact my healthcare provider? · Your pain does not decrease, even after treatment  · You have questions or concerns about your condition or care  When should I seek immediate care? · You have severe pain in the injured area  · You have numbness in the injured area  · You cannot put any weight on or move the injured area  CARE AGREEMENT:   You have the right to help plan your care  Learn about your health condition and how it may be treated  Discuss treatment options with your caregivers to decide what care you want to receive  You always have the right to refuse treatment  The above information is an  only  It is not intended as medical advice for individual conditions or treatments  Talk to your doctor, nurse or pharmacist before following any medical regimen to see if it is safe and effective for you  © 2017 [address] Information is for End User's use only and may not be sold, redistributed or otherwise used for commercial purposes  All illustrations and images included in CareNotes® are the copyrighted property of A D A M , Inc  or Darrell Pacheco

## 2019-09-10 ENCOUNTER — HOSPITAL ENCOUNTER (EMERGENCY)
Facility: HOSPITAL | Age: 39
Discharge: HOME | End: 2019-09-10
Attending: EMERGENCY MEDICINE | Admitting: EMERGENCY MEDICINE
Payer: COMMERCIAL

## 2019-09-10 VITALS
OXYGEN SATURATION: 98 % | RESPIRATION RATE: 19 BRPM | HEART RATE: 66 BPM | DIASTOLIC BLOOD PRESSURE: 88 MMHG | SYSTOLIC BLOOD PRESSURE: 148 MMHG | TEMPERATURE: 97.7 F

## 2019-09-10 DIAGNOSIS — T75.4XXA ELECTRICAL SHOCK OF HAND, INITIAL ENCOUNTER: Primary | ICD-10-CM

## 2019-09-10 LAB
ATRIAL RATE: 68 BPM
CK SERPL-CCNC: 114 U/L (ref 39–308)
P AXIS: 49 DEGREES
PR INTERVAL: 172 MS
QRS AXIS: 75 DEGREES
QRSD INTERVAL: 90 MS
QT INTERVAL: 380 MS
QTC INTERVAL: 404 MS
T WAVE AXIS: 38 DEGREES
VENTRICULAR RATE: 68 BPM

## 2019-09-10 PROCEDURE — 93010 ELECTROCARDIOGRAM REPORT: CPT | Performed by: INTERNAL MEDICINE

## 2019-09-10 PROCEDURE — 93005 ELECTROCARDIOGRAM TRACING: CPT

## 2019-09-10 PROCEDURE — 36415 COLL VENOUS BLD VENIPUNCTURE: CPT | Performed by: EMERGENCY MEDICINE

## 2019-09-10 PROCEDURE — 82550 ASSAY OF CK (CPK): CPT | Performed by: EMERGENCY MEDICINE

## 2019-09-10 PROCEDURE — 99285 EMERGENCY DEPT VISIT HI MDM: CPT

## 2019-09-10 PROCEDURE — 99283 EMERGENCY DEPT VISIT LOW MDM: CPT | Performed by: EMERGENCY MEDICINE

## 2019-09-10 NOTE — ED PROVIDER NOTES
History  Chief Complaint   Patient presents with   [address]     pt states to have been "electrocuted during work today with wires outside" pt c/o warm sensation going up right arm to neck      Patient is a 41-year-old male  Shortly prior to arrival he was working on exposed wires attached stool well  The wires or hot patient suffered an electric shock to his right hand  There is no loss of consciousness or seizure  Tetanus vaccination is up-to-date  He is complaining of pain and tingling of the right hand and arm  There are no wounds  He denies other injuries  No significant past medical history  Prior to Admission Medications   Prescriptions Last Dose Informant Patient Reported? Taking?   indomethacin (INDOCIN) 50 mg capsule   No No   Sig: Take 1 capsule (50 mg total) by mouth 3 (three) times a day with meals for 7 days As needed for pain control      Facility-Administered Medications: None       History reviewed  No pertinent past medical history  Past Surgical History:   Procedure Laterality Date    ROOT CANAL         History reviewed  No pertinent family history  I have reviewed and agree with the history as documented  Social History     Tobacco Use    Smoking status: Current Every Day Smoker     Packs/day: 1 00     Types: Cigarettes    Smokeless tobacco: Never Used   Substance Use Topics    Alcohol use: No    Drug use: No        Review of Systems   Constitutional: Negative for chills and fever  HENT: Negative for rhinorrhea and sore throat  Eyes: Negative for pain, redness and visual disturbance  Respiratory: Negative for cough and shortness of breath  Cardiovascular: Negative for chest pain and leg swelling  Gastrointestinal: Negative for abdominal pain, diarrhea and vomiting  Endocrine: Negative for polydipsia and polyuria  Genitourinary: Negative for dysuria, frequency and hematuria  Musculoskeletal: Negative for back pain and neck pain     Skin: Negative for pallor, rash and wound  Allergic/Immunologic: Negative for immunocompromised state  Neurological: Positive for numbness  Negative for weakness and headaches  Psychiatric/Behavioral: Negative for hallucinations and suicidal ideas  All other systems reviewed and are negative  Physical Exam  Physical Exam   Constitutional: He is oriented to person, place, and time  He appears well-developed and well-nourished  No distress  HENT:   Head: Normocephalic and atraumatic  Eyes: Right eye exhibits no discharge  Left eye exhibits no discharge  No scleral icterus  Neck: Normal range of motion  Neck supple  Cardiovascular: Normal rate, regular rhythm, normal heart sounds and intact distal pulses  Exam reveals no gallop and no friction rub  No murmur heard  Pulmonary/Chest: Effort normal and breath sounds normal  No respiratory distress  He has no wheezes  He has no rales  Abdominal: Soft  Bowel sounds are normal  He exhibits no distension  There is no tenderness  There is no rebound and no guarding  Musculoskeletal: Normal range of motion  He exhibits no edema, tenderness or deformity  There are no wounds  Neurological: He is alert and oriented to person, place, and time  He has normal strength  No sensory deficit  GCS eye subscore is 4  GCS verbal subscore is 5  GCS motor subscore is 6  Skin: Skin is warm and dry  No rash noted  He is not diaphoretic  Psychiatric: He has a normal mood and affect  His behavior is normal    Vitals reviewed        Vital Signs  ED Triage Vitals [09/10/19 1406]   Temperature Pulse Respirations Blood Pressure SpO2   97 7 °F (36 5 °C) 82 18 148/88 98 %      Temp Source Heart Rate Source Patient Position - Orthostatic VS BP Location FiO2 (%)   Oral Monitor Sitting Left arm --      Pain Score       6           Vitals:    09/10/19 1406   BP: 148/88   Pulse: 82   Patient Position - Orthostatic VS: Sitting         Visual Acuity      ED Medications  Medications - No data to display    Diagnostic Studies  Results Reviewed     Procedure Component Value Units Date/Time    CK (with reflex to MB) [[id number]]  (Normal) Collected:  09/10/19 1454    Lab Status:  Final result Specimen:  Blood from Arm, Right Updated:  09/10/19 1531     Total  U/L                  No orders to display              Procedures  ECG 12 Lead Documentation Only  Date/Time: 9/10/2019 2:40 PM  Performed by: Tamara Spencer MD  Authorized by: Tamara Spencer MD     ECG reviewed by me, the ED Provider: yes    Patient location:  ED  Interpretation:     Interpretation: normal    Rate:     ECG rate assessment: normal    Rhythm:     Rhythm: sinus rhythm    Ectopy:     Ectopy: none    QRS:     QRS axis:  Normal  Conduction:     Conduction: normal    ST segments:     ST segments:  Normal  T waves:     T waves: normal             ED Course                               MDM  Number of Diagnoses or Management Options  Diagnosis management comments: EKG was normal   CK was normal   No loss of consciousness  Normal neurologic exam   Patient was observed  No arrhythmia  Appropriate for discharge and outpatient management  Amount and/or Complexity of Data Reviewed  Clinical lab tests: ordered and reviewed  Independent visualization of images, tracings, or specimens: yes        Disposition  Final diagnoses:   Electrical shock of hand, initial encounter     Time reflects when diagnosis was documented in both MDM as applicable and the Disposition within this note     Time User Action Codes Description Comment    9/10/2019  4:14 PM Marcos Manzo  4XXA] Electrical shock of hand, initial encounter       ED Disposition     ED Disposition Condition Date/Time Comment    Discharge Stable Tue Sep 10, 2019  4:14 PM Martin Pearson discharge to home/self care              Follow-up Information     Follow up With Specialties Details Why Contact Info    [employer]   Follow-up with family doctor if not better in 3-4 days, call for referral [phone]            Patient's Medications   Discharge Prescriptions    No medications on file     No discharge procedures on file      ED Provider  Electronically Signed by           Laura Carson MD  09/10/19 [facility] Karen Daley MD  09/10/19 6508

## 2020-01-16 ENCOUNTER — HOSPITAL ENCOUNTER (EMERGENCY)
Facility: HOSPITAL | Age: 40
Discharge: HOME | End: 2020-01-16
Attending: EMERGENCY MEDICINE | Admitting: EMERGENCY MEDICINE
Payer: COMMERCIAL

## 2020-01-16 VITALS
BODY MASS INDEX: 32.92 KG/M2 | WEIGHT: 229.94 LBS | RESPIRATION RATE: 20 BRPM | TEMPERATURE: 97.6 F | DIASTOLIC BLOOD PRESSURE: 84 MMHG | OXYGEN SATURATION: 99 % | HEART RATE: 82 BPM | HEIGHT: 70 IN | SYSTOLIC BLOOD PRESSURE: 151 MMHG

## 2020-01-16 DIAGNOSIS — F41.9 ANXIETY: Primary | ICD-10-CM

## 2020-01-16 DIAGNOSIS — F41.0 PANIC ATTACK: ICD-10-CM

## 2020-01-16 PROCEDURE — 99283 EMERGENCY DEPT VISIT LOW MDM: CPT

## 2020-01-16 PROCEDURE — 99284 EMERGENCY DEPT VISIT MOD MDM: CPT | Performed by: EMERGENCY MEDICINE

## 2020-01-16 PROCEDURE — 96372 THER/PROPH/DIAG INJ SC/IM: CPT

## 2020-01-16 RX ORDER — LORAZEPAM 2 MG/ML
2 INJECTION INTRAMUSCULAR ONCE
Status: COMPLETED | OUTPATIENT
Start: 2020-01-16 | End: 2020-01-16

## 2020-01-16 RX ORDER — LORAZEPAM 1 MG/1
1 TABLET ORAL 3 TIMES DAILY PRN
Qty: 15 TABLET | Refills: 0 | Status: SHIPPED | OUTPATIENT
Start: 2020-01-16 | End: 2020-07-17 | Stop reason: ALTCHOICE

## 2020-01-16 RX ADMIN — LORAZEPAM 2 MG: 2 INJECTION INTRAMUSCULAR; INTRAVENOUS at 11:57

## 2020-01-16 NOTE — ED PROVIDER NOTES
History  Chief Complaint   Patient presents with    Panic Attack     Patient comes to ED having a panic attack  Patient wife states he has history of panic attack but has been having an increase in them over the past week  Patient states his last panic attack was yesterday  Patients wife states that he is currently being treated with talk therapy for his attacks and no PO medication  Pt comes to ED by POV for panic attack  Wife provides most of the history  Pt unable to speak due to panic and anxiety sxs  He reports several days of sxs, worse than ever before, feels like his heart is racing and it is hard to breath  No syncope cough or hemoptysis  No leg pain or swelling or h/o vte  No recent surgery  No cardiac or pulmonary hx  sxs seem caused and aggravated by a very stressful week at work  No SI or HI  No other sxs or concerns at this time  Prior to Admission Medications   Prescriptions Last Dose Informant Patient Reported? Taking?   indomethacin (INDOCIN) 50 mg capsule   No No   Sig: Take 1 capsule (50 mg total) by mouth 3 (three) times a day with meals for 7 days As needed for pain control      Facility-Administered Medications: None       Past Medical History:   Diagnosis Date    Panic attack     Recovering alcoholic ([facility] [address] )        Past Surgical History:   Procedure Laterality Date    ROOT CANAL         History reviewed  No pertinent family history  I have reviewed and agree with the history as documented  Social History     Tobacco Use    Smoking status: Current Every Day Smoker     Packs/day: 1 00     Types: Cigarettes    Smokeless tobacco: Never Used   Substance Use Topics    Alcohol use: No    Drug use: No        Review of Systems   Psychiatric/Behavioral: Negative for agitation, behavioral problems, confusion, decreased concentration, dysphoric mood, hallucinations, self-injury, sleep disturbance and suicidal ideas  The patient is nervous/anxious  The patient is not hyperactive  All other systems reviewed and are negative  Physical Exam  Physical Exam   Constitutional: He is oriented to person, place, and time  He appears well-developed and well-nourished  No distress  Appears upset  No distress  HENT:   Head: Normocephalic and atraumatic  Eyes: Pupils are equal, round, and reactive to light  Conjunctivae and EOM are normal  Right eye exhibits no discharge  Left eye exhibits no discharge  Neck: Normal range of motion  Neck supple  No JVD present  Cardiovascular: Normal rate, regular rhythm, normal heart sounds and intact distal pulses  Exam reveals no gallop and no friction rub  No murmur heard  Pulmonary/Chest: Effort normal and breath sounds normal  No stridor  No respiratory distress  He has no wheezes  He has no rales  He exhibits no tenderness  Musculoskeletal: Normal range of motion  He exhibits no edema or deformity  Neurological: He is alert and oriented to person, place, and time  No cranial nerve deficit or sensory deficit  He exhibits normal muscle tone  Coordination normal    Skin: Skin is warm and dry  Capillary refill takes less than 2 seconds  No rash noted  He is not diaphoretic  No erythema  No pallor  Nursing note and vitals reviewed        Vital Signs  ED Triage Vitals [01/16/20 1142]   Temperature Pulse Respirations Blood Pressure SpO2   97 6 °F (36 4 °C) 82 20 151/84 99 %      Temp Source Heart Rate Source Patient Position - Orthostatic VS BP Location FiO2 (%)   Oral Monitor Sitting Left arm --      Pain Score       8           Vitals:    01/16/20 1142   BP: 151/84   Pulse: 82   Patient Position - Orthostatic VS: Sitting         Visual Acuity      ED Medications  Medications   LORazepam (ATIVAN) 2 mg/mL injection 2 mg (2 mg Intramuscular Given 1/16/20 1157)       Diagnostic Studies  Results Reviewed     None                 No orders to display              Procedures  Procedures         ED Course  ED Course as of Jan 16 1911   u Jan 16, 2020 1215 12:15 PM still having sxs but improved after ativan, appears improved  [address], wife reports resolution of sxs and comfortable w plan to d/c home  MDM      Disposition  Final diagnoses:   Anxiety   Panic attack     Time reflects when diagnosis was documented in both MDM as applicable and the Disposition within this note     Time User Action Codes Description Comment    1/16/2020 12:44 PM Kaur Mayor Add [F41 9] Anxiety     1/16/2020 12:44 PM Kaur Kernr Add [F41 0] Panic attack       ED Disposition     ED Disposition Condition Date/Time Comment    Discharge Stable Thu Jan 16, 2020 12:44 PM Coty Felizson discharge to home/self care  Follow-up Information    None         Discharge Medication List as of 1/16/2020 12:45 PM      START taking these medications    Details   LORazepam (ATIVAN) 1 mg tablet Take 1 tablet (1 mg total) by mouth 3 (three) times a day as needed for anxiety for up to 15 doses, Starting Thu 1/16/2020, Print         CONTINUE these medications which have NOT CHANGED    Details   indomethacin (INDOCIN) 50 mg capsule Take 1 capsule (50 mg total) by mouth 3 (three) times a day with meals for 7 days As needed for pain control, Starting Sun 12/2/2018, Until Sun 12/9/2018, Print           No discharge procedures on file      ED Provider  Electronically Signed by           Nona Cohn MD  01/16/20 Dutch Mendoza MD  01/16/20 8103

## 2020-01-30 ENCOUNTER — HOSPITAL ENCOUNTER (EMERGENCY)
Facility: HOSPITAL | Age: 40
Discharge: HOME | End: 2020-01-30
Attending: EMERGENCY MEDICINE | Admitting: EMERGENCY MEDICINE
Payer: COMMERCIAL

## 2020-01-30 ENCOUNTER — APPOINTMENT (EMERGENCY)
Dept: CT IMAGING | Facility: HOSPITAL | Age: 40
End: 2020-01-30
Payer: COMMERCIAL

## 2020-01-30 VITALS
BODY MASS INDEX: 32.92 KG/M2 | RESPIRATION RATE: 18 BRPM | HEART RATE: 69 BPM | HEIGHT: 70 IN | SYSTOLIC BLOOD PRESSURE: 146 MMHG | DIASTOLIC BLOOD PRESSURE: 68 MMHG | WEIGHT: 229.94 LBS | OXYGEN SATURATION: 97 % | TEMPERATURE: 97.6 F

## 2020-01-30 DIAGNOSIS — S22.41XA CLOSED FRACTURE OF MULTIPLE RIBS OF RIGHT SIDE, INITIAL ENCOUNTER: Primary | ICD-10-CM

## 2020-01-30 PROCEDURE — 99284 EMERGENCY DEPT VISIT MOD MDM: CPT | Performed by: PHYSICIAN ASSISTANT

## 2020-01-30 PROCEDURE — 99284 EMERGENCY DEPT VISIT MOD MDM: CPT

## 2020-01-30 PROCEDURE — 71250 CT THORAX DX C-: CPT

## 2020-01-30 RX ORDER — ACETAMINOPHEN 325 MG/1
975 TABLET ORAL ONCE
Status: COMPLETED | OUTPATIENT
Start: 2020-01-30 | End: 2020-01-30

## 2020-01-30 RX ORDER — IBUPROFEN 400 MG/1
800 TABLET ORAL ONCE
Status: COMPLETED | OUTPATIENT
Start: 2020-01-30 | End: 2020-01-30

## 2020-01-30 RX ORDER — METHOCARBAMOL 500 MG/1
500 TABLET, FILM COATED ORAL 2 TIMES DAILY
Qty: 20 TABLET | Refills: 0 | Status: SHIPPED | OUTPATIENT
Start: 2020-01-30 | End: 2020-07-17 | Stop reason: ALTCHOICE

## 2020-01-30 RX ADMIN — ACETAMINOPHEN 975 MG: 325 TABLET, FILM COATED ORAL at 11:58

## 2020-01-30 RX ADMIN — IBUPROFEN 800 MG: 400 TABLET ORAL at 11:58

## 2020-01-30 NOTE — ED PROVIDER NOTES
History  Chief Complaint   Patient presents with    Fall     pt post fall while snowbording 3 days ago, co of R sided chest/shoulder pain, no LOC, head injury or thinners   Shoulder Injury     81-year-old male was otherwise healthy presenting for evaluation of sternal pain x 3 days  Patient was snowboarding and went over a small jump and landed on his right arm and shoulder  It was about an 8 foot jump  Patient denies LOC  He was able to get up himself without assistance  Patient continues to have right chest wall pain which prompted him to seek medical attention  Patient denies shortness of breath, abdominal pain, neck pain, extremity pain  He is not on any blood thinners  History provided by:  Patient   used: No    Fall   Mechanism of injury: fall    Injury location:  Shoulder/arm  Shoulder/arm injury location:  R shoulder  Incident location:  Outdoors  Time since incident:  3 days  Arrived directly from scene: no    Fall:     Fall occurred:  Skiing/snowboarding    Height of fall:  8 ft    Impact surface:  [employer] of impact: Right shoulder  Entrapped after fall: no    Suspicion of alcohol use: no    Suspicion of drug use: no    Prior to arrival data:     Patient ambulatory at scene: yes      Loss of consciousness: no      Amnesic to event: no    Associated symptoms: chest pain    Associated symptoms: no abdominal pain, no back pain, no blindness, no difficulty breathing, no headaches, no hearing loss, no loss of consciousness, no nausea, no neck pain and no vomiting    Risk factors: no anticoagulation therapy        Prior to Admission Medications   Prescriptions Last Dose Informant Patient Reported? Taking?    LORazepam (ATIVAN) 1 mg tablet   No No   Sig: Take 1 tablet (1 mg total) by mouth 3 (three) times a day as needed for anxiety for up to 15 doses   indomethacin (INDOCIN) 50 mg capsule   No No   Sig: Take 1 capsule (50 mg total) by mouth 3 (three) times a day with meals for 7 days As needed for pain control      Facility-Administered Medications: None       Past Medical History:   Diagnosis Date    Panic attack     Recovering alcoholic ([facility] [address] )        Past Surgical History:   Procedure Laterality Date    ROOT CANAL         History reviewed  No pertinent family history  I have reviewed and agree with the history as documented  Social History     Tobacco Use    Smoking status: Current Every Day Smoker     Packs/day: 1 00     Types: Cigarettes    Smokeless tobacco: Never Used   Substance Use Topics    Alcohol use: No    Drug use: No        Review of Systems   Constitutional: Negative for chills and fever  HENT: Negative for drooling and hearing loss  Eyes: Negative for blindness, photophobia and visual disturbance  Respiratory: Negative for chest tightness, shortness of breath and stridor  Cardiovascular: Positive for chest pain  Gastrointestinal: Negative for abdominal pain, nausea and vomiting  Musculoskeletal: Negative for back pain, neck pain and neck stiffness  Skin: Negative for color change and rash  Allergic/Immunologic: Negative for immunocompromised state  Neurological: Negative for dizziness, loss of consciousness, syncope and headaches  Hematological: Does not bruise/bleed easily  Psychiatric/Behavioral: Negative for confusion  All other systems reviewed and are negative  Physical Exam  Physical Exam   Constitutional: He appears well-developed and well-nourished  No distress  Nontoxic appearing   HENT:   Head: Normocephalic and atraumatic  Right Ear: External ear normal    Left Ear: External ear normal    Mouth/Throat: Oropharynx is clear and moist    Eyes: Conjunctivae are normal  Right eye exhibits no discharge  Left eye exhibits no discharge  No scleral icterus  Neck: Normal range of motion  Neck supple  Cardiovascular: Normal rate, regular rhythm and normal heart sounds  No murmur heard    Pulses:       Radial pulses are 2+ on the right side, and 2+ on the left side  Pulmonary/Chest: Effort normal and breath sounds normal  No stridor  No respiratory distress  He has no wheezes  He has no rales  He exhibits tenderness  + Right sided chest wall pain  No ecchymosis or crepitus   Abdominal: Soft  Bowel sounds are normal  He exhibits no distension  There is no tenderness  There is no guarding  Musculoskeletal: Normal range of motion  He exhibits no deformity  Right shoulder: He exhibits normal range of motion, no tenderness, no bony tenderness, no swelling, no effusion, no deformity and no laceration  Neurological: He is alert  He is not disoriented  GCS eye subscore is 4  GCS verbal subscore is 5  GCS motor subscore is 6  Skin: Skin is warm and dry  He is not diaphoretic  Psychiatric: He has a normal mood and affect  His behavior is normal    Nursing note and vitals reviewed  Vital Signs  ED Triage Vitals [01/30/20 1105]   Temperature Pulse Respirations Blood Pressure SpO2   97 6 °F (36 4 °C) 69 18 146/68 97 %      Temp Source Heart Rate Source Patient Position - Orthostatic VS BP Location FiO2 (%)   Oral Monitor Sitting Left arm --      Pain Score       7           Vitals:    01/30/20 1105   BP: 146/68   Pulse: 69   Patient Position - Orthostatic VS: Sitting         Visual Acuity  Visual Acuity      Most Recent Value   L Pupil Size (mm)  3   R Pupil Size (mm)  3          ED Medications  Medications   acetaminophen (TYLENOL) tablet 975 mg (975 mg Oral Given 1/30/20 1158)   ibuprofen (MOTRIN) tablet 800 mg (800 mg Oral Given 1/30/20 1158)       Diagnostic Studies  Results Reviewed     None                 CT chest without contrast   Final Result by Brayan Clay MD (01/30 1201)      Subtle nondisplaced anterolateral right 5th and 6th rib fractures                 Workstation performed: [id number]                    Procedures  Procedures         ED Course                   MDM  Number of Diagnoses or Management Options  Closed fracture of multiple ribs of right side, initial encounter: new and requires workup  Diagnosis management comments: 44yo male presenting for right chest wall pain x 3 days after falling while snowboarding  No bony tenderness in right shoulder and ROM normal  No shortness of breath with normal oxygen saturation  Differential diagnosis includes but is not limited to: rib fracture, rib contusion, pneumothorax  CT chest ordered which revealed nondisplaced fractures of 5th and 6th ribs  Patient given incentive spirometry and patient education provided  Supportive care discussed including round the clock Tylenol and ibuprofen  Script for Robaxin given for spasms  Advised close PCP follow-up  ED return precautions discussed  Patient expressed understanding and is agreeable to plan  Patient discharged in stable condition  Amount and/or Complexity of Data Reviewed  Tests in the radiology section of CPT®: ordered and reviewed  Review and summarize past medical records: yes  Independent visualization of images, tracings, or specimens: yes    Risk of Complications, Morbidity, and/or Mortality  Presenting problems: low  Diagnostic procedures: low  Management options: low    Patient Progress  Patient progress: stable        Disposition  Final diagnoses:   Closed fracture of multiple ribs of right side, initial encounter     Time reflects when diagnosis was documented in both MDM as applicable and the Disposition within this note     Time User Action Codes Description Comment    1/30/2020 12:25 PM [address] [place] [address] [S22 41XA] Closed fracture of multiple ribs of right side, initial encounter       ED Disposition     ED Disposition Condition Date/Time Comment    Discharge Stable u Jan 30, 2020 12:25 PM China Boyer discharge to home/self care              Follow-up Information     Follow up With Specialties Details Why Contact Info Additional [id number] [facility] Schedule an appointment as soon as possible for a visit   [address] Emergency Department Emergency Medicine  If symptoms worsen [address] [facility] [id number]  [address] [place] [address] ED, [address], [place], [place], [address]          Discharge Medication List as of 1/30/2020 12:26 PM      START taking these medications    Details   methocarbamol (ROBAXIN) 500 mg tablet Take 1 tablet (500 mg total) by mouth 2 (two) times a day, Starting Thu 1/30/2020, Print         CONTINUE these medications which have NOT CHANGED    Details   indomethacin (INDOCIN) 50 mg capsule Take 1 capsule (50 mg total) by mouth 3 (three) times a day with meals for 7 days As needed for pain control, Starting Sun 12/2/2018, Until Sun 12/9/2018, Print      LORazepam (ATIVAN) 1 mg tablet Take 1 tablet (1 mg total) by mouth 3 (three) times a day as needed for anxiety for up to 15 doses, Starting Thu 1/16/2020, Print           No discharge procedures on file      ED Provider  Electronically Signed by           Simran Cheng PA-C  01/30/20 4515

## 2020-07-06 ENCOUNTER — APPOINTMENT (EMERGENCY)
Dept: RADIOLOGY | Facility: HOSPITAL | Age: 40
End: 2020-07-06
Payer: COMMERCIAL

## 2020-07-06 ENCOUNTER — HOSPITAL ENCOUNTER (EMERGENCY)
Facility: HOSPITAL | Age: 40
Discharge: HOME | End: 2020-07-06
Attending: EMERGENCY MEDICINE
Payer: COMMERCIAL

## 2020-07-06 ENCOUNTER — APPOINTMENT (EMERGENCY)
Dept: CT IMAGING | Facility: HOSPITAL | Age: 40
End: 2020-07-06
Payer: COMMERCIAL

## 2020-07-06 VITALS
OXYGEN SATURATION: 96 % | BODY MASS INDEX: 31.5 KG/M2 | TEMPERATURE: 98.1 F | RESPIRATION RATE: 18 BRPM | WEIGHT: 220 LBS | HEART RATE: 62 BPM | SYSTOLIC BLOOD PRESSURE: 110 MMHG | HEIGHT: 70 IN | DIASTOLIC BLOOD PRESSURE: 72 MMHG

## 2020-07-06 DIAGNOSIS — R63.4 WEIGHT LOSS: ICD-10-CM

## 2020-07-06 DIAGNOSIS — M25.50 JOINT PAIN: Primary | ICD-10-CM

## 2020-07-06 LAB
ALBUMIN SERPL BCP-MCNC: 4 G/DL (ref 3.5–5)
ALP SERPL-CCNC: 76 U/L (ref 46–116)
ALT SERPL W P-5'-P-CCNC: 44 U/L (ref 12–78)
ANION GAP SERPL CALCULATED.3IONS-SCNC: 7 MMOL/L (ref 4–13)
AST SERPL W P-5'-P-CCNC: 22 U/L (ref 5–45)
BASOPHILS # BLD AUTO: 0.06 THOUSANDS/ΜL (ref 0–0.1)
BASOPHILS NFR BLD AUTO: 1 % (ref 0–1)
BILIRUB SERPL-MCNC: 0.3 MG/DL (ref 0.2–1)
BUN SERPL-MCNC: 11 MG/DL (ref 5–25)
CA-I BLD-SCNC: 1.14 MMOL/L (ref 1.12–1.32)
CALCIUM SERPL-MCNC: 9.1 MG/DL (ref 8.3–10.1)
CHLORIDE SERPL-SCNC: 104 MMOL/L (ref 100–108)
CK SERPL-CCNC: 147 U/L (ref 39–308)
CO2 SERPL-SCNC: 29 MMOL/L (ref 21–32)
CREAT SERPL-MCNC: 0.88 MG/DL (ref 0.6–1.3)
EOSINOPHIL # BLD AUTO: 0.44 THOUSAND/ΜL (ref 0–0.61)
EOSINOPHIL NFR BLD AUTO: 6 % (ref 0–6)
ERYTHROCYTE [DISTWIDTH] IN BLOOD BY AUTOMATED COUNT: 13.2 % (ref 11.6–15.1)
GFR SERPL CREATININE-BSD FRML MDRD: 109 ML/MIN/1.73SQ M
GLUCOSE SERPL-MCNC: 89 MG/DL (ref 65–140)
HCT VFR BLD AUTO: 46.2 % (ref 36.5–49.3)
HGB BLD-MCNC: 15.2 G/DL (ref 12–17)
IMM GRANULOCYTES # BLD AUTO: 0.01 THOUSAND/UL (ref 0–0.2)
IMM GRANULOCYTES NFR BLD AUTO: 0 % (ref 0–2)
LYMPHOCYTES # BLD AUTO: 2.25 THOUSANDS/ΜL (ref 0.6–4.47)
LYMPHOCYTES NFR BLD AUTO: 30 % (ref 14–44)
MAGNESIUM SERPL-MCNC: 2.2 MG/DL (ref 1.6–2.6)
MCH RBC QN AUTO: 28.8 PG (ref 26.8–34.3)
MCHC RBC AUTO-ENTMCNC: 32.9 G/DL (ref 31.4–37.4)
MCV RBC AUTO: 88 FL (ref 82–98)
MONOCYTES # BLD AUTO: 0.77 THOUSAND/ΜL (ref 0.17–1.22)
MONOCYTES NFR BLD AUTO: 10 % (ref 4–12)
NEUTROPHILS # BLD AUTO: 4.08 THOUSANDS/ΜL (ref 1.85–7.62)
NEUTS SEG NFR BLD AUTO: 53 % (ref 43–75)
NRBC BLD AUTO-RTO: 0 /100 WBCS
PHOSPHATE SERPL-MCNC: 3.4 MG/DL (ref 2.7–4.5)
PLATELET # BLD AUTO: 253 THOUSANDS/UL (ref 149–390)
PMV BLD AUTO: 9 FL (ref 8.9–12.7)
POTASSIUM SERPL-SCNC: 4.2 MMOL/L (ref 3.5–5.3)
PROT SERPL-MCNC: 7.5 G/DL (ref 6.4–8.2)
RBC # BLD AUTO: 5.28 MILLION/UL (ref 3.88–5.62)
SODIUM SERPL-SCNC: 140 MMOL/L (ref 136–145)
TROPONIN I SERPL-MCNC: <0.02 NG/ML
WBC # BLD AUTO: 7.61 THOUSAND/UL (ref 4.31–10.16)

## 2020-07-06 PROCEDURE — 80053 COMPREHEN METABOLIC PANEL: CPT | Performed by: EMERGENCY MEDICINE

## 2020-07-06 PROCEDURE — 82330 ASSAY OF CALCIUM: CPT | Performed by: EMERGENCY MEDICINE

## 2020-07-06 PROCEDURE — 84484 ASSAY OF TROPONIN QUANT: CPT | Performed by: EMERGENCY MEDICINE

## 2020-07-06 PROCEDURE — 82550 ASSAY OF CK (CPK): CPT | Performed by: EMERGENCY MEDICINE

## 2020-07-06 PROCEDURE — 84100 ASSAY OF PHOSPHORUS: CPT | Performed by: EMERGENCY MEDICINE

## 2020-07-06 PROCEDURE — 83735 ASSAY OF MAGNESIUM: CPT | Performed by: EMERGENCY MEDICINE

## 2020-07-06 PROCEDURE — 86618 LYME DISEASE ANTIBODY: CPT | Performed by: EMERGENCY MEDICINE

## 2020-07-06 PROCEDURE — 71046 X-RAY EXAM CHEST 2 VIEWS: CPT

## 2020-07-06 PROCEDURE — 87798 DETECT AGENT NOS DNA AMP: CPT | Performed by: EMERGENCY MEDICINE

## 2020-07-06 PROCEDURE — 36415 COLL VENOUS BLD VENIPUNCTURE: CPT | Performed by: EMERGENCY MEDICINE

## 2020-07-06 PROCEDURE — 99285 EMERGENCY DEPT VISIT HI MDM: CPT | Performed by: EMERGENCY MEDICINE

## 2020-07-06 PROCEDURE — 74176 CT ABD & PELVIS W/O CONTRAST: CPT

## 2020-07-06 PROCEDURE — 93005 ELECTROCARDIOGRAM TRACING: CPT

## 2020-07-06 PROCEDURE — 99284 EMERGENCY DEPT VISIT MOD MDM: CPT

## 2020-07-06 PROCEDURE — 85025 COMPLETE CBC W/AUTO DIFF WBC: CPT | Performed by: EMERGENCY MEDICINE

## 2020-07-06 PROCEDURE — 71250 CT THORAX DX C-: CPT

## 2020-07-06 PROCEDURE — 96361 HYDRATE IV INFUSION ADD-ON: CPT

## 2020-07-06 PROCEDURE — 96374 THER/PROPH/DIAG INJ IV PUSH: CPT

## 2020-07-06 RX ORDER — KETOROLAC TROMETHAMINE 30 MG/ML
15 INJECTION, SOLUTION INTRAMUSCULAR; INTRAVENOUS ONCE
Status: COMPLETED | OUTPATIENT
Start: 2020-07-06 | End: 2020-07-06

## 2020-07-06 RX ORDER — SODIUM CHLORIDE 9 MG/ML
3 INJECTION INTRAVENOUS
Status: DISCONTINUED | OUTPATIENT
Start: 2020-07-06 | End: 2020-07-06 | Stop reason: HOSPADM

## 2020-07-06 RX ADMIN — SODIUM CHLORIDE 1000 ML: 0.9 INJECTION, SOLUTION INTRAVENOUS at 18:10

## 2020-07-06 RX ADMIN — KETOROLAC TROMETHAMINE 15 MG: 30 INJECTION, SOLUTION INTRAMUSCULAR at 18:09

## 2020-07-07 LAB — B BURGDOR IGG+IGM SER-ACNC: <0.91 ISR (ref 0–0.9)

## 2020-07-07 NOTE — ED PROVIDER NOTES
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 due to the pain and generalized weakness  He also reports that over the last month he has lost about 30 lb  He denies any fevers/chills, chest pain, shortness of breath, known sick contacts, rash, nausea/vomiting, diarrhea/constipation, or urinary symptoms  He denies any recent travel  States that he has tried Aleve without any relief  No other complaints  History provided by:  Patient  Fatigue   Severity:  Moderate  Onset quality:  Gradual  Timing:  Constant  Progression:  Worsening  Chronicity:  New  Relieved by:  Nothing  Worsened by:  Nothing  Ineffective treatments:  Drinking fluids and medication  Associated symptoms: myalgias    Associated symptoms: no abdominal pain, no chest pain, no cough, no diarrhea, no difficulty walking, no dysuria, no falls, no fever, no frequency, no headaches, no loss of consciousness, no nausea, no shortness of breath, no urgency and no vomiting        Prior to Admission Medications   Prescriptions Last Dose Informant Patient Reported? Taking?    LORazepam (ATIVAN) 1 mg tablet   No No   Sig: Take 1 tablet (1 mg total) by mouth 3 (three) times a day as needed for anxiety for up to 15 doses   indomethacin (INDOCIN) 50 mg capsule   No No   Sig: Take 1 capsule (50 mg total) by mouth 3 (three) times a day with meals for 7 days As needed for pain control   methocarbamol (ROBAXIN) 500 mg tablet   No No   Sig: Take 1 tablet (500 mg total) by mouth 2 (two) times a day      Facility-Administered Medications: None       Past Medical History:   Diagnosis Date    Panic attack     Recovering alcoholic ([address] )        Past Surgical History:   Procedure Laterality Date    ROOT CANAL         History reviewed  No pertinent family history  I have reviewed and agree with the history as documented  E-Cigarette/Vaping    E-Cigarette Use Never User      E-Cigarette/Vaping Substances     Social History     Tobacco Use    Smoking status: Current Every Day Smoker     Packs/day: 1 00     Types: Cigarettes    Smokeless tobacco: Never Used   Substance Use Topics    Alcohol use: No    Drug use: Yes     Types: Marijuana       Review of Systems   Constitutional: Positive for fatigue  Negative for chills and fever  HENT: Negative for congestion, ear pain and sore throat  Eyes: Negative for pain and visual disturbance  Respiratory: Negative for cough, shortness of breath and wheezing  Cardiovascular: Negative for chest pain and leg swelling  Gastrointestinal: Negative for abdominal pain, diarrhea, nausea and vomiting  Genitourinary: Negative for dysuria, frequency, hematuria and urgency  Musculoskeletal: Positive for myalgias  Negative for falls, neck pain and neck stiffness  Skin: Negative for rash and wound  Neurological: Negative for loss of consciousness, weakness, numbness and headaches  Psychiatric/Behavioral: Negative for agitation and confusion  All other systems reviewed and are negative  Physical Exam  Physical Exam   Constitutional: He is oriented to person, place, and time  He appears well-developed and well-nourished  HENT:   Head: Normocephalic and atraumatic  Eyes: Pupils are equal, round, and reactive to light  EOM are normal    Neck: Normal range of motion  Neck supple  Cardiovascular: Normal rate and regular rhythm     Pulmonary/Chest: Effort normal and breath sounds normal  No stridor  No respiratory distress  He has no wheezes  He has no rales  Abdominal: Soft  Bowel sounds are normal  He exhibits no distension  There is no tenderness  Musculoskeletal: Normal range of motion  Neurological: He is alert and oriented to person, place, and time  No focal deficits   Skin: Skin is warm and dry  Nursing note and vitals reviewed        Vital Signs  ED Triage Vitals [07/06/20 1702]   Temperature Pulse Respirations Blood Pressure SpO2   98 1 °F (36 7 °C) 74 16 128/86 98 %      Temp Source Heart Rate Source Patient Position - Orthostatic VS BP Location FiO2 (%)   Oral Monitor Sitting Left arm --      Pain Score       6           Vitals:    07/06/20 1930 07/06/20 2000 07/06/20 2030 07/06/20 2100   BP: 119/62 109/68 118/80 110/72   Pulse: 62 62 62 62   Patient Position - Orthostatic VS:  Lying           Visual Acuity      ED Medications  Medications   ketorolac (TORADOL) injection 15 mg (15 mg Intravenous Given 7/6/20 1809)   sodium chloride 0 9 % bolus 1,000 mL (0 mL Intravenous Stopped 7/6/20 2010)       Diagnostic Studies  Results Reviewed     Procedure Component Value Units Date/Time    CK (with reflex to MB) [[id number]]  (Normal) Collected:  07/06/20 1808    Lab Status:  Final result Specimen:  Blood from Arm, Right Updated:  07/06/20 1904     Total  U/L     Magnesium [[id number]]  (Normal) Collected:  07/06/20 1808    Lab Status:  Final result Specimen:  Blood from Arm, Right Updated:  07/06/20 1904     Magnesium 2 2 mg/dL     Phosphorus [[id number]]  (Normal) Collected:  07/06/20 1808    Lab Status:  Final result Specimen:  Blood from Arm, Right Updated:  07/06/20 1904     Phosphorus 3 4 mg/dL     Troponin I [[id number]]  (Normal) Collected:  07/06/20 1808    Lab Status:  Final result Specimen:  Blood from Arm, Right Updated:  07/06/20 1835     Troponin I <0 02 ng/mL     Comprehensive metabolic panel [[id number]] Collected:  07/06/20 1808    Lab Status:  Final result Specimen:  Blood from Arm, Right Updated:  07/06/20 1832     Sodium 140 mmol/L      Potassium 4 2 mmol/L      Chloride 104 mmol/L      CO2 29 mmol/L      ANION GAP 7 mmol/L      BUN 11 mg/dL      Creatinine 0 88 mg/dL      Glucose 89 mg/dL      Calcium 9 1 mg/dL      AST 22 U/L      ALT 44 U/L      Alkaline Phosphatase 76 U/L      Total Protein 7 5 g/dL      Albumin 4 0 g/dL      Total Bilirubin 0 30 mg/dL      eGFR 109 ml/min/1 73sq m     Narrative:       National Kidney Disease Foundation guidelines for Chronic Kidney Disease (CKD):     Stage 1 with normal or high GFR (GFR > 90 mL/min/1 73 square meters)    Stage 2 Mild CKD (GFR = 60-89 mL/min/1 73 square meters)    Stage 3A Moderate CKD (GFR = 45-59 mL/min/1 73 square meters)    Stage 3B Moderate CKD (GFR = 30-44 mL/min/1 73 square meters)    Stage 4 Severe CKD (GFR = 15-29 mL/min/1 73 square meters)    Stage 5 End Stage CKD (GFR <15 mL/min/1 73 square meters)  Note: GFR calculation is accurate only with a steady state creatinine    Calcium, ionized [[id number]]  (Normal) Collected:  07/06/20 1808    Lab Status:  Final result Specimen:  Blood from Arm, Right Updated:  07/06/20 1821     Calcium, Ionized 1 14 mmol/L     CBC and differential [[id number]] Collected:  07/06/20 1808    Lab Status:  Final result Specimen:  Blood from Arm, Right Updated:  07/06/20 1816     WBC 7 61 Thousand/uL      RBC 5 28 Million/uL      Hemoglobin 15 2 g/dL      Hematocrit 46 2 %      MCV 88 fL      MCH 28 8 pg      MCHC 32 9 g/dL      RDW 13 2 %      MPV 9 0 fL      Platelets 247 Thousands/uL      nRBC 0 /100 WBCs      Neutrophils Relative 53 %      Immat GRANS % 0 %      Lymphocytes Relative 30 %      Monocytes Relative 10 %      Eosinophils Relative 6 %      Basophils Relative 1 %      Neutrophils Absolute 4 08 Thousands/µL      Immature Grans Absolute 0 01 Thousand/uL      Lymphocytes Absolute 2 25 Thousands/µL      Monocytes Absolute 0 77 Thousand/µL      Eosinophils Absolute 0 44 Thousand/µL      Basophils Absolute 0 06 Thousands/µL     Anaplasma Phagocytophilum, PCR [[id number]] Collected:  07/06/20 1808    Lab Status: In process Specimen:  Blood from Arm, Right Updated:  07/06/20 1813    Lyme Antibody Profile with reflex to [facility] [[id number]] Collected:  07/06/20 1808    Lab Status: In process Specimen:  Blood from Arm, Right Updated:  07/06/20 1813                 CT chest abdomen pelvis wo contrast   ED Interpretation by Deisi Tubbs DO (07/06 2102)   No evidence of acute process in the chest, abdomen or pelvis  Final Result by Marques Galarza MD (07/06 2046)      No evidence of acute process in the chest, abdomen or pelvis  Workstation performed: [id number]         XR chest 2 views   Final Result by Sandy Machado MD (07/07 1000)      No acute cardiopulmonary disease  Workstation performed: [id number]                    Procedures  ECG 12 Lead Documentation Only  Date/Time: 7/6/2020 6:30 PM  Performed by: Deisi Tubbs DO  Authorized by: Deisi Tubbs DO     Patient location:  ED  Previous ECG:     Previous ECG:  Compared to current    Comparison ECG info:  9/10/19    Similarity:  No change  Rate:     ECG rate:  55    ECG rate assessment: bradycardic    Rhythm:     Rhythm: sinus bradycardia    Ectopy:     Ectopy: none    QRS:     QRS axis:  Normal    QRS intervals:  Normal  ST segments:     ST segments:  Normal  T waves:     T waves: normal               ED Course       US AUDIT      Most Recent Value   Initial Alcohol Screen: US AUDIT-C    1  How often do you have a drink containing alcohol?  0 Filed at: 07/06/2020 1703   2  How many drinks containing alcohol do you have on a typical day you are drinking? 0 Filed at: 07/06/2020 1703   3a  Male UNDER 65: How often do you have five or more drinks on one occasion?   0 Filed at: 07/06/2020 1703   Audit-C Score  0 Filed at: 07/06/2020 1703 ANA PAULA/DAST-10      Most Recent Value   How many times in the past year have you    Used an illegal drug or used a prescription medication for non-medical reasons? Daily or Almost Daily Filed at: 07/06/2020 1704   In the past 12 months      1  Have you used drugs other than those required for medical reasons? 1 Filed at: 07/06/2020 1704   2  Do you use more than one drug at a time? 0 Filed at: 07/06/2020 1704   3  Have you had medical problems as a result of your drug use (e g , memory loss, hepatitis, convulsions, bleeding, etc )? 0 Filed at: 07/06/2020 1704   4  Have you had "blackouts" or "flashbacks" as a result of drug use? YesNo  0 Filed at: 07/06/2020 1704   5  Do you ever feel bad or guilty about your drug use? 0 Filed at: 07/06/2020 1704   6  Does your spouse (or parent) ever complain about your involvement with drugs? 0 Filed at: 07/06/2020 1704   7  Have you neglected your family because of your use of drugs? 0 Filed at: 07/06/2020 1704   8  Have you engaged in illegal activities in order to obtain drugs? 0 Filed at: 07/06/2020 1704   9  Have you ever experienced withdrawal symptoms (felt sick) when you stopped taking drugs? 0 Filed at: 07/06/2020 1704   10  Are you always able to stop using drugs when you want to?  0 Filed at: 07/06/2020 1704   DAST-10 Score  1 Filed at: 07/06/2020 1704                                MDM  Number of Diagnoses or Management Options  Joint pain: new and requires workup  Weight loss: new and requires workup  Diagnosis management comments: Patient with generalized joint pain, weakness, and weight loss- will get labs including electrolytes, lyme, EKG, and CT imaging to rule out malignancy  Patient reevaluated and feels improved  Patient updated on results of tests  Discharge instructions given including follow-up, and return precautions  Patient demonstrates verbal understanding and agrees with plan         Amount and/or Complexity of Data Reviewed  Clinical lab tests: ordered and reviewed  Tests in the radiology section of CPT®: reviewed and ordered  Tests in the medicine section of CPT®: ordered and reviewed  Discussion of test results with the performing providers: yes  Decide to obtain previous medical records or to obtain history from someone other than the patient: yes  Obtain history from someone other than the patient: yes  Review and summarize past medical records: yes  Discuss the patient with other providers: yes  Independent visualization of images, tracings, or specimens: yes    Patient Progress  Patient progress: improved        Disposition  Final diagnoses:   Joint pain   Weight loss     Time reflects when diagnosis was documented in both MDM as applicable and the Disposition within this note     Time User Action Codes Description Comment    7/6/2020  9:18 PM [address] Marfaye Carr [M25 50] Joint pain     7/6/2020  9:18 PM Sis Morris [R63 4] Weight loss       ED Disposition     ED Disposition Condition Date/Time Comment    Discharge Stable Mon Jul 6, 2020  9:18 PM Starlett Goldberg discharge to home/self care  Follow-up Information     Follow up With Specialties Details Why Contact Info Additional [id number] [facility],  Internal Medicine Call in 1 day for follow up within 1 week [address] [address] Emergency Department Emergency Medicine Go to  immediately for any new or worsening symptoms   [address] [facility] [id number]  [address] [place] [address] ED, [address] [place], [place], [address]          Discharge Medication List as of 7/6/2020  9:21 PM      CONTINUE these medications which have NOT CHANGED    Details   indomethacin (INDOCIN) 50 mg capsule Take 1 capsule (50 mg total) by mouth 3 (three) times a day with meals for 7 days As needed for pain control, Starting Sun 12/2/2018, Until Sun 12/9/2018, Print      LORazepam (ATIVAN) 1 mg tablet Take 1 tablet (1 mg total) by mouth 3 (three) times a day as needed for anxiety for up to 15 doses, Starting Thu 1/16/2020, Print      methocarbamol (ROBAXIN) 500 mg tablet Take 1 tablet (500 mg total) by mouth 2 (two) times a day, Starting Thu 1/30/2020, Print           No discharge procedures on file      PDMP Review     None          ED Provider  Electronically Signed by           Joanna Chicas DO  07/07/20 1326

## 2020-07-08 LAB
ATRIAL RATE: 55 BPM
P AXIS: 48 DEGREES
PR INTERVAL: 168 MS
QRS AXIS: 71 DEGREES
QRSD INTERVAL: 90 MS
QT INTERVAL: 426 MS
QTC INTERVAL: 407 MS
T WAVE AXIS: 53 DEGREES
VENTRICULAR RATE: 55 BPM

## 2020-07-08 PROCEDURE — 93010 ELECTROCARDIOGRAM REPORT: CPT | Performed by: INTERNAL MEDICINE

## 2020-07-12 LAB — A PHAGOCYTOPH DNA BLD QL NAA+PROBE: NEGATIVE

## 2020-07-16 ENCOUNTER — TELEPHONE (OUTPATIENT)
Dept: INTERNAL MEDICINE CLINIC | Facility: CLINIC | Age: 40
End: 2020-07-16

## 2020-07-16 NOTE — TELEPHONE ENCOUNTER

## 2020-07-17 ENCOUNTER — OFFICE VISIT (OUTPATIENT)
Dept: INTERNAL MEDICINE CLINIC | Facility: CLINIC | Age: 40
End: 2020-07-17
Payer: COMMERCIAL

## 2020-07-17 ENCOUNTER — APPOINTMENT (OUTPATIENT)
Dept: LAB | Facility: CLINIC | Age: 40
End: 2020-07-17
Payer: COMMERCIAL

## 2020-07-17 VITALS
DIASTOLIC BLOOD PRESSURE: 80 MMHG | RESPIRATION RATE: 12 BRPM | BODY MASS INDEX: 30.29 KG/M2 | SYSTOLIC BLOOD PRESSURE: 130 MMHG | TEMPERATURE: 98.2 F | HEART RATE: 84 BPM | WEIGHT: 211.6 LBS | HEIGHT: 70 IN

## 2020-07-17 DIAGNOSIS — R63.4 ABNORMAL WEIGHT LOSS: ICD-10-CM

## 2020-07-17 DIAGNOSIS — Z11.4 ENCOUNTER FOR SCREENING FOR HIV: ICD-10-CM

## 2020-07-17 DIAGNOSIS — E66.9 OBESITY (BMI 30-39.9): ICD-10-CM

## 2020-07-17 DIAGNOSIS — R42 DIZZINESS: Primary | ICD-10-CM

## 2020-07-17 DIAGNOSIS — R51.9 CHRONIC INTRACTABLE HEADACHE, UNSPECIFIED HEADACHE TYPE: ICD-10-CM

## 2020-07-17 DIAGNOSIS — M25.50 ARTHRALGIA, UNSPECIFIED JOINT: ICD-10-CM

## 2020-07-17 DIAGNOSIS — F17.210 CIGARETTE NICOTINE DEPENDENCE WITHOUT COMPLICATION: ICD-10-CM

## 2020-07-17 DIAGNOSIS — R19.7 DIARRHEA, UNSPECIFIED TYPE: ICD-10-CM

## 2020-07-17 DIAGNOSIS — G89.29 CHRONIC INTRACTABLE HEADACHE, UNSPECIFIED HEADACHE TYPE: ICD-10-CM

## 2020-07-17 DIAGNOSIS — F41.0 PANIC ATTACKS: ICD-10-CM

## 2020-07-17 PROBLEM — M25.569 ACUTE KNEE PAIN: Status: RESOLVED | Noted: 2018-03-13 | Resolved: 2020-07-17

## 2020-07-17 PROBLEM — S83.412A TEAR OF MCL (MEDIAL COLLATERAL LIGAMENT) OF KNEE, LEFT, INITIAL ENCOUNTER: Status: RESOLVED | Noted: 2018-03-26 | Resolved: 2020-07-17

## 2020-07-17 PROBLEM — S89.92XA INJURY OF LEFT LOWER EXTREMITY: Status: RESOLVED | Noted: 2018-03-12 | Resolved: 2020-07-17

## 2020-07-17 LAB
ALBUMIN SERPL BCP-MCNC: 4.3 G/DL (ref 3.5–5)
ALP SERPL-CCNC: 75 U/L (ref 46–116)
ALT SERPL W P-5'-P-CCNC: 38 U/L (ref 12–78)
ANION GAP SERPL CALCULATED.3IONS-SCNC: 4 MMOL/L (ref 4–13)
AST SERPL W P-5'-P-CCNC: 16 U/L (ref 5–45)
BACTERIA UR QL AUTO: ABNORMAL /HPF
BILIRUB SERPL-MCNC: 0.26 MG/DL (ref 0.2–1)
BILIRUB UR QL STRIP: NEGATIVE
BUN SERPL-MCNC: 15 MG/DL (ref 5–25)
C3 SERPL-MCNC: 150 MG/DL (ref 90–180)
C4 SERPL-MCNC: 26 MG/DL (ref 10–40)
CALCIUM SERPL-MCNC: 9.2 MG/DL (ref 8.3–10.1)
CHLORIDE SERPL-SCNC: 106 MMOL/L (ref 100–108)
CHOLEST SERPL-MCNC: 241 MG/DL (ref 50–200)
CLARITY UR: CLEAR
CO2 SERPL-SCNC: 27 MMOL/L (ref 21–32)
COLOR UR: YELLOW
CREAT SERPL-MCNC: 0.8 MG/DL (ref 0.6–1.3)
CRP SERPL QL: <3 MG/L
ERYTHROCYTE [DISTWIDTH] IN BLOOD BY AUTOMATED COUNT: 13.2 % (ref 11.6–15.1)
ERYTHROCYTE [SEDIMENTATION RATE] IN BLOOD: 19 MM/HOUR (ref 0–10)
EST. AVERAGE GLUCOSE BLD GHB EST-MCNC: 105 MG/DL
GFR SERPL CREATININE-BSD FRML MDRD: 113 ML/MIN/1.73SQ M
GLUCOSE P FAST SERPL-MCNC: 102 MG/DL (ref 65–99)
GLUCOSE UR STRIP-MCNC: NEGATIVE MG/DL
HBA1C MFR BLD: 5.3 %
HCT VFR BLD AUTO: 50.2 % (ref 36.5–49.3)
HDLC SERPL-MCNC: 35 MG/DL
HGB BLD-MCNC: 16.6 G/DL (ref 12–17)
HGB UR QL STRIP.AUTO: NEGATIVE
HYALINE CASTS #/AREA URNS LPF: ABNORMAL /LPF
KETONES UR STRIP-MCNC: NEGATIVE MG/DL
LDLC SERPL CALC-MCNC: 181 MG/DL (ref 0–100)
LEUKOCYTE ESTERASE UR QL STRIP: ABNORMAL
MCH RBC QN AUTO: 29.3 PG (ref 26.8–34.3)
MCHC RBC AUTO-ENTMCNC: 33.1 G/DL (ref 31.4–37.4)
MCV RBC AUTO: 89 FL (ref 82–98)
NITRITE UR QL STRIP: NEGATIVE
NON-SQ EPI CELLS URNS QL MICRO: ABNORMAL /HPF
PH UR STRIP.AUTO: 5.5 [PH]
PLATELET # BLD AUTO: 276 THOUSANDS/UL (ref 149–390)
PMV BLD AUTO: 9.6 FL (ref 8.9–12.7)
POTASSIUM SERPL-SCNC: 4.3 MMOL/L (ref 3.5–5.3)
PROT SERPL-MCNC: 8.1 G/DL (ref 6.4–8.2)
PROT UR STRIP-MCNC: NEGATIVE MG/DL
RBC # BLD AUTO: 5.67 MILLION/UL (ref 3.88–5.62)
RBC #/AREA URNS AUTO: ABNORMAL /HPF
RHEUMATOID FACT SER QL LA: NEGATIVE
SODIUM SERPL-SCNC: 137 MMOL/L (ref 136–145)
SP GR UR STRIP.AUTO: 1.02 (ref 1–1.03)
TRIGL SERPL-MCNC: 127 MG/DL
TSH SERPL DL<=0.05 MIU/L-ACNC: 1.84 UIU/ML (ref 0.36–3.74)
UROBILINOGEN UR QL STRIP.AUTO: 0.2 E.U./DL
WBC # BLD AUTO: 10.45 THOUSAND/UL (ref 4.31–10.16)
WBC #/AREA URNS AUTO: ABNORMAL /HPF

## 2020-07-17 PROCEDURE — 87389 HIV-1 AG W/HIV-1&-2 AB AG IA: CPT

## 2020-07-17 PROCEDURE — 86038 ANTINUCLEAR ANTIBODIES: CPT

## 2020-07-17 PROCEDURE — 84443 ASSAY THYROID STIM HORMONE: CPT

## 2020-07-17 PROCEDURE — 36415 COLL VENOUS BLD VENIPUNCTURE: CPT

## 2020-07-17 PROCEDURE — 86430 RHEUMATOID FACTOR TEST QUAL: CPT

## 2020-07-17 PROCEDURE — 86160 COMPLEMENT ANTIGEN: CPT

## 2020-07-17 PROCEDURE — 86235 NUCLEAR ANTIGEN ANTIBODY: CPT

## 2020-07-17 PROCEDURE — 86140 C-REACTIVE PROTEIN: CPT

## 2020-07-17 PROCEDURE — 85652 RBC SED RATE AUTOMATED: CPT

## 2020-07-17 PROCEDURE — 81001 URINALYSIS AUTO W/SCOPE: CPT | Performed by: NURSE PRACTITIONER

## 2020-07-17 PROCEDURE — 99204 OFFICE O/P NEW MOD 45 MIN: CPT | Performed by: NURSE PRACTITIONER

## 2020-07-17 PROCEDURE — 4004F PT TOBACCO SCREEN RCVD TLK: CPT | Performed by: NURSE PRACTITIONER

## 2020-07-17 PROCEDURE — 80061 LIPID PANEL: CPT

## 2020-07-17 PROCEDURE — 85027 COMPLETE CBC AUTOMATED: CPT

## 2020-07-17 PROCEDURE — 83036 HEMOGLOBIN GLYCOSYLATED A1C: CPT

## 2020-07-17 PROCEDURE — 80053 COMPREHEN METABOLIC PANEL: CPT

## 2020-07-17 PROCEDURE — 3008F BODY MASS INDEX DOCD: CPT | Performed by: NURSE PRACTITIONER

## 2020-07-17 RX ORDER — ESCITALOPRAM OXALATE 5 MG/1
5 TABLET ORAL DAILY
Qty: 30 TABLET | Refills: 0 | Status: SHIPPED | OUTPATIENT
Start: 2020-07-17 | End: 2020-12-04 | Stop reason: ALTCHOICE

## 2020-07-17 NOTE — ASSESSMENT & PLAN NOTE
The patient is complaining of episodes of dizziness several times during the day  The patient denies any associated vision changes, nausea or vomiting  This is been going on for at least a year  The patient feels that he is well hydrated during the day  He does work construction  Blood work has been ordered for the patient  A CT scan of the brain has also been ordered both for the dizziness and the daily headaches

## 2020-07-17 NOTE — ASSESSMENT & PLAN NOTE
The patient complaining of joint pain which is migratory in nature  He states it does affect his hands, knees and shoulders  He does work construction  The patient was treated for Lyme disease in the past 6 months, however, he has had to Lyme panels which were negative  He states he did pull 3 ticks off of his back which were engorged and tested and they were positive for Lyme  Blood work has been ordered for the patient  He does have an upcoming up appointment with rheumatology in August   I encouraged the patient to keep that appointment

## 2020-07-17 NOTE — ASSESSMENT & PLAN NOTE
Patient with a history of panic attacks which he states he has had approximately 3 in the last several months  He was evaluated in the emergency room for chest pain which they believe was a panic attack  He has never been on medication for this  He has not seen a behavioral therapist   He is a recovering alcoholic  I did recommend both behavioral therapy and medication for the patient  At this time the patient does not want to pursue behavioral therapy but is willing to try medication  Lexapro 5 mg was sent to the patient's pharmacy  I will see him back in 3 weeks to check his response to this medication

## 2020-07-17 NOTE — ASSESSMENT & PLAN NOTE
Patient states he is having in daily headache  There are no aggravating or alleviating factors  He feels that these are located behind his eyes  He has not seen an ophthalmologist   I did recommend to the patient that he get a professional eye exam to rule out any underlying pathology  In addition a CT scan of the head has been ordered

## 2020-07-17 NOTE — ASSESSMENT & PLAN NOTE
The patient continues a pack per day for the past 23 years  Patient has attempted to quit in the past with both nicotine patches and lozenges but has always return to smoking  Short discussion was had with the patient regarding smoking cessation  We will revisit this at his next appointment

## 2020-07-17 NOTE — ASSESSMENT & PLAN NOTE
The patient has an approximate 30 lb weight loss in the past 3-4 months  This has been unintentional   The patient does not provide a family history as he is adopted  Blood work has been ordered for the patient

## 2020-07-17 NOTE — PATIENT INSTRUCTIONS
Panic Attack   WHAT YOU NEED TO KNOW:   A panic attack is a sudden, strong feeling of fear even though you are not in danger  You also have physical symptoms such as rapid breathing or heavy sweating  Symptoms are usually worst about 10 minutes after they start and can last up to 20 minutes  You may feel like you are having a heart attack  You may have a panic attack before an event, such as a public speech you have to give  A panic attack can also happen for no clear reason  Frequent panic attacks may be a sign of a panic disorder that needs long-term treatment  DISCHARGE INSTRUCTIONS:   Return to the emergency department if:   · You have severe chest pain, shortness of breath, or irregular heartbeats  · You have thoughts of harming yourself or another person  Contact your healthcare provider if:   · You have new or worsening panic attacks after treatment  · You have questions or concerns about your condition or care  Medicines:   · Medicines  may be given to make you feel more relaxed or to reduce anxiety that causes a panic attack  Some medicines are taken only when you are having a panic attack  Other medicines can be taken to prevent panic attacks  · Take your medicine as directed  Contact your healthcare provider if you think your medicine is not helping or if you have side effects  Tell him of her if you are allergic to any medicine  Keep a list of the medicines, vitamins, and herbs you take  Include the amounts, and when and why you take them  Bring the list or the pill bottles to follow-up visits  Carry your medicine list with you in case of an emergency  Follow up with your healthcare provider as directed:  Write down your questions so you remember to ask them during your visits  Manage or prevent a panic attack:   · Manage stress  Stress can trigger a panic attack  Yoga and meditation are good ways to help manage stress   It might be helpful to talk to someone about the stress in your life     · Exercise as directed  Exercise can reduce stress and help you sleep better  Your healthcare provider can help you create an exercise plan  · Set a sleep schedule  Too little sleep can increase anxiety  Go to bed at the same time each night and wake up at the same time each morning  Keep your room quiet and free from distractions, such as a television or computer  · Limit alcohol and caffeine  Alcohol and caffeine can both increase anxiety and make it difficult for you to sleep well  A drink of alcohol is 12 ounces of beer, 5 ounces of wine, or 1½ ounces of liquor  · Eat a variety of healthy foods  Healthy foods include fruits, vegetables, low-fat dairy products, lean meats, fish, and beans  Limit sugar  Sugar can increase your symptoms  · Do not smoke  Nicotine and other chemicals in cigarettes and cigars can increase anxiety and also cause lung damage  Ask your healthcare provider for information if you currently smoke and need help to quit  E-cigarettes or smokeless tobacco still contain nicotine  Talk to your healthcare provider before you use these products  © 2017 [facility] Information is for End User's use only and may not be sold, redistributed or otherwise used for commercial purposes  All illustrations and images included in CareNotes® are the copyrighted property of [employer]  or Darrell Tara  The above information is an  only  It is not intended as medical advice for individual conditions or treatments  Talk to your doctor, nurse or pharmacist before following any medical regimen to see if it is safe and effective for you  Weight Management   WHAT YOU NEED TO KNOW:   Being overweight increases your risk of health conditions such as heart disease, high blood pressure, type 2 diabetes, and certain types of cancer  It can also increase your risk for osteoarthritis, sleep apnea, and other respiratory problems   Aim for a slow, steady weight loss  Even a small amount of weight loss can lower your risk of health problems  DISCHARGE INSTRUCTIONS:   How to lose weight safely:  A safe and healthy way to lose weight is to eat fewer calories and get regular exercise  You can lose up about 1 pound a week by decreasing the number of calories you eat by 500 calories each day  You can decrease calories by eating smaller portion sizes or by cutting out high-calorie foods  Read labels to find out how many calories are in the foods you eat  You can also burn calories with exercise such as walking, swimming, or biking  You will be more likely to keep weight off if you make these changes part of your lifestyle  Healthy meal plan for weight management:  A healthy meal plan includes a variety of foods, contains fewer calories, and helps you stay healthy  A healthy meal plan includes the following:  · Eat whole-grain foods more often  A healthy meal plan should contain fiber  Fiber is the part of grains, fruits, and vegetables that is not broken down by your body  Whole-grain foods are healthy and provide extra fiber in your diet  Some examples of whole-grain foods are whole-wheat breads and pastas, oatmeal, brown rice, and bulgur  · Eat a variety of vegetables every day  Include dark, leafy greens such as spinach, kale, irvin greens, and mustard greens  Eat yellow and orange vegetables such as carrots, sweet potatoes, and winter squash  · Eat a variety of fruits every day  Choose fresh or canned fruit (canned in its own juice or light syrup) instead of juice  Fruit juice has very little or no fiber  · Eat low-fat dairy foods  Drink fat-free (skim) milk or 1% milk  Eat fat-free yogurt and low-fat cottage cheese  Try low-fat cheeses such as mozzarella and other reduced-fat cheeses  · Choose meat and other protein foods that are low in fat  Choose beans or other legumes such as split peas or lentils   Choose fish, skinless poultry (chicken or turkey), or lean cuts of red meat (beef or pork)  Before you cook meat or poultry, cut off any visible fat  · Use less fat and oil  Try baking foods instead of frying them  Add less fat, such as margarine, sour cream, regular salad dressing, and mayonnaise to foods  Eat fewer high-fat foods  Some examples of high-fat foods include french fries, doughnuts, ice cream, and cakes  · Eat fewer sweets  Limit foods and drinks that are high in sugar  This includes candy, cookies, regular soda, and sweetened drinks  Ways to decrease calories:   · Eat smaller portions  ¨ Use a small plate with smaller servings  ¨ Do not eat second helpings  ¨ When you eat at a restaurant, ask for a box and place half of your meal in the box before you eat  ¨ Share an entrée with someone else  · Replace high-calorie snacks with healthy, low-calorie snacks  ¨ Choose fresh fruit, vegetables, fat-free rice cakes, or air-popped popcorn instead of potato chips, nuts, or chocolate  ¨ Choose water or calorie-free drinks instead of soda or sweetened drinks  · Eat regular meals  Skipping meals can lead to overeating later in the day  Eat a healthy snack in place of a meal if you do not have time to eat a regular meal      · Do not shop for groceries when you are hungry  You may be more likely to make unhealthy food choices  Take a grocery list of healthy foods and shop after you have eaten  Exercise:  Exercise at least 30 minutes per day on most days of the week  Some examples of exercise include walking, biking, dancing, and swimming  You can also fit in more physical activity by taking the stairs instead of the elevator or parking farther away from stores  Ask your healthcare provider about the best exercise plan for you  Other things to consider as you try to lose weight:   · Be aware of situations that may give you the urge to overeat, such as eating while watching television   Find ways to avoid these situations  For example, read a book, go for a walk, or do crafts  · Meet with a weight loss support group or friends who are also trying to lose weight  This may help you stay motivated to continue working on your weight loss goals  © 2017 [address] [employer] Information is for End User's use only and may not be sold, redistributed or otherwise used for commercial purposes  All illustrations and images included in CareNotes® are the copyrighted property of [employer]  or Darrell Pacheco  The above information is an  only  It is not intended as medical advice for individual conditions or treatments  Talk to your doctor, nurse or pharmacist before following any medical regimen to see if it is safe and effective for you

## 2020-07-17 NOTE — ASSESSMENT & PLAN NOTE
Patient complaining of diarrhea for approximately 1 year  He does not detect any extremely foul odor to this diarrhea  He denies any abdominal pain or blood in his stool  He has been on antibiotics several times in the past year  Patient has been recommended to try both probiotics and Imodium for his diarrhea  Blood work has been ordered

## 2020-07-17 NOTE — PROGRESS NOTES
INTERNAL MEDICINE INITIAL OFFICE VISIT  [facility] - [facility]    NAME: Amaya Lux  AGE: 46 y o  SEX: male  : 1980     DATE: 2020     Assessment and Plan:     Problem List Items Addressed This Visit        Other    Nicotine dependence       The patient continues a pack per day for the past 23 years  Patient has attempted to quit in the past with both nicotine patches and lozenges but has always return to smoking  Short discussion was had with the patient regarding smoking cessation  We will revisit this at his next appointment  Dizziness - Primary       The patient is complaining of episodes of dizziness several times during the day  The patient denies any associated vision changes, nausea or vomiting  This is been going on for at least a year  The patient feels that he is well hydrated during the day  He does work construction  Blood work has been ordered for the patient  A CT scan of the brain has also been ordered both for the dizziness and the daily headaches  Relevant Orders    CT head wo contrast    Arthralgia       The patient complaining of joint pain which is migratory in nature  He states it does affect his hands, knees and shoulders  He does work construction  The patient was treated for Lyme disease in the past 6 months, however, he has had to Lyme panels which were negative  He states he did pull 3 ticks off of his back which were engorged and tested and they were positive for Lyme  Blood work has been ordered for the patient  He does have an upcoming up appointment with rheumatology in August   I encouraged the patient to keep that appointment           Relevant Orders    ROHINI Screen w/ Reflex to Titer/Pattern    C3 complement    C4 complement    C-reactive protein    RF Screen w/ Reflex to Titer    Sedimentation rate, automated    Sjogren's Antibodies    CBC    Chronic intractable headache       Patient states he is having in daily headache  There are no aggravating or alleviating factors  He feels that these are located behind his eyes  He has not seen an ophthalmologist   I did recommend to the patient that he get a professional eye exam to rule out any underlying pathology  In addition a CT scan of the head has been ordered  Relevant Orders    CT head wo contrast    Abnormal weight loss       The patient has an approximate 30 lb weight loss in the past 3-4 months  This has been unintentional   The patient does not provide a family history as he is adopted  Blood work has been ordered for the patient  Relevant Orders    Hemoglobin A1C    TSH, 3rd generation with Free T4 reflex    UA (URINE) with reflex to Scope    Panic attacks       Patient with a history of panic attacks which he states he has had approximately 3 in the last several months  He was evaluated in the emergency room for chest pain which they believe was a panic attack  He has never been on medication for this  He has not seen a behavioral therapist   He is a recovering alcoholic  I did recommend both behavioral therapy and medication for the patient  At this time the patient does not want to pursue behavioral therapy but is willing to try medication  Lexapro 5 mg was sent to the patient's pharmacy  I will see him back in 3 weeks to check his response to this medication  Relevant Medications    escitalopram (LEXAPRO) 5 mg tablet    Diarrhea       Patient complaining of diarrhea for approximately 1 year  He does not detect any extremely foul odor to this diarrhea  He denies any abdominal pain or blood in his stool  He has been on antibiotics several times in the past year  Patient has been recommended to try both probiotics and Imodium for his diarrhea  Blood work has been ordered  Obesity (BMI 30-39  9)       The patient's BMI in the office today is 30    The patient states that he has had an unexplained 30 lb weight loss in the past 3-4 months  He denies any blood in the stool or abdominal pain  The patient does not have any family history as he is adopted  Relevant Orders    Comprehensive metabolic panel    Lipid Panel with Direct LDL reflex    UA (URINE) with reflex to Scope      Other Visit Diagnoses     Encounter for screening for HIV        Relevant Orders    HIV 1/2 Antigen/Antibody (4th Generation) w Reflex [facility]          Return in about 3 weeks (around 8/7/2020) for Office Visit, Kai Rodriguez  Chief Complaint:     Chief Complaint   Patient presents with   [address]     new patient, ER follow up- [facility]  History of Present Illness:     Nuno Rosales to the office today to establish care  He is a 61-year-old male with a past history of alcohol abuse, nicotine dependence, hidradenitis and anxiety  The patient comes in with multiple complaints  He was recently evaluated in the emergency room for migratory joint pain  He was treated for Lyme disease earlier this year however he did have 2- Lyme panels  He has had this joint pain for approximately a year  No workup has been performed  He does have an upcoming appointment with rheumatology in August   There are no aggravating and alleviating factors  He does work construction  Blood work has been ordered for the patient  In addition the patient is complaining of daily headaches and dizziness  He believes these headaches are located behind his eyes  He has not seen an ophthalmologist for complete exam and I did recommend he make an appointment for that  A CT scan of the head has been ordered  Blood work has been ordered  The patient also suffers from chronic diarrhea  He recently started on probiotics and Imodium was recommended  He has an approximate 30 lb weight loss over the past 3-4 months  This was not intentional   The patient also suffers from panic attacks and Lexapro has been started    He has up-to-date on his Tdap vaccines with his last 1 being approximately 2018  I will see him back in the office in 3 weeks  The following portions of the patient's history were reviewed and updated as appropriate: allergies, current medications, past family history, past medical history, past social history, past surgical history and problem list      Review of Systems:     Review of Systems   Constitutional: Negative  Negative for fatigue  HENT: Negative  Negative for congestion, postnasal drip, rhinorrhea and trouble swallowing  Eyes: Positive for pain  Negative for visual disturbance  Respiratory: Negative  Negative for choking and shortness of breath  Cardiovascular: Negative  Negative for chest pain  Gastrointestinal: Positive for diarrhea  Endocrine: Positive for polydipsia  Genitourinary: Negative  Musculoskeletal: Positive for arthralgias  Negative for back pain, myalgias and neck pain  Skin: Negative  Neurological: Positive for dizziness and headaches  Psychiatric/Behavioral: The patient is nervous/anxious           Past Medical History:     Past Medical History:   Diagnosis Date    Lyme disease     Panic attack     Recovering alcoholic ([facility] [address] )         Past Surgical History:     Past Surgical History:   Procedure Laterality Date    ROOT CANAL          Social History:     Social History     Socioeconomic History    Marital status: /Civil Union     Spouse name: None    Number of children: None    Years of education: None    Highest education level: None   Occupational History    None   Social Needs    Financial resource strain: None    Food insecurity:     Worry: None     Inability: None    Transportation needs:     Medical: None     Non-medical: None   Tobacco Use    Smoking status: Current Every Day Smoker     Packs/day: 1 00     Years: 25 00     Pack years: 25 00     Types: Cigarettes    Smokeless tobacco: Never Used   Substance and Sexual Activity    Alcohol use: No    Drug use: Yes     Types: Marijuana    Sexual activity: None   Lifestyle    Physical activity:     Days per week: 5 days     Minutes per session: 60 min    Stress: Rather much   Relationships    Social connections:     Talks on phone: None     Gets together: None     Attends [employer] service: None     Active member of club or organization: None     Attends meetings of clubs or organizations: None     Relationship status: None    Intimate partner violence:     Fear of current or ex partner: None     Emotionally abused: None     Physically abused: None     Forced sexual activity: None   Other Topics Concern    None   Social History Narrative    None         Family History:     Family History   Adopted: Yes        Current Medications:     Current Outpatient Medications:     escitalopram (LEXAPRO) 5 mg tablet, Take 1 tablet (5 mg total) by mouth daily, Disp: 30 tablet, Rfl: 0     Allergies:   No Known Allergies     Physical Exam:     /80 (BP Location: Left arm, Patient Position: Sitting, Cuff Size: Standard)   Pulse 84   Temp 98 2 °F (36 8 °C) (Temporal)   Resp 12   Ht 5' 10" (1 778 m)   Wt 96 kg (211 lb 9 6 oz)   BMI 30 36 kg/m²     Physical Exam   Constitutional: He is oriented to person, place, and time  He appears well-developed and well-nourished  No distress  HENT:   Head: Normocephalic and atraumatic  Right Ear: Hearing, tympanic membrane, external ear and ear canal normal    Left Ear: Hearing, external ear and ear canal normal    Nose: Nose normal    Mouth/Throat: Oropharynx is clear and moist  No oropharyngeal exudate  Eyes: Pupils are equal, round, and reactive to light  Conjunctivae and EOM are normal  Right eye exhibits no discharge  Left eye exhibits no discharge  Neck: Normal range of motion  Neck supple  No JVD present  No tracheal deviation present  No thyromegaly present  Cardiovascular: Normal rate, regular rhythm, normal heart sounds and intact distal pulses     No murmur heard   Pulmonary/Chest: Effort normal and breath sounds normal  No respiratory distress  He has no wheezes  Abdominal: Soft  Bowel sounds are normal  There is no tenderness  Musculoskeletal: Normal range of motion  He exhibits no edema  Lymphadenopathy:     He has no cervical adenopathy  Neurological: He is alert and oriented to person, place, and time  Skin: Skin is warm and dry  Capillary refill takes less than 2 seconds  Psychiatric: He has a normal mood and affect  His behavior is normal  Judgment and thought content normal    Vitals reviewed  BMI Counseling: Body mass index is 30 36 kg/m²  The BMI is above normal  Nutrition recommendations include decreasing portion sizes, consuming healthier snacks, limiting drinks that contain sugar and moderation in carbohydrate intake  Exercise recommendations include moderate physical activity 150 minutes/week  No pharmacotherapy was ordered  Depression Screening and Follow-up Plan: Patient's depression screening was positive with a PHQ-2 score of 6  Their PHQ-9 score was 22  Patient assessed for underlying major depression  Brief counseling provided and recommend additional follow-up/re-evaluation next office visit  Lexapro started  Will reevaluate at his next visit in three weeks  Behavorial therapy declined by patient    Tobacco Cessation Counseling: Tobacco cessation counseling was provided  The patient is sincerely urged to quit consumption of tobacco  He is not ready to quit tobacco       Data:     Laboratory Results: I have personally reviewed the pertinent laboratory results/reports   Radiology/Other Diagnostic Testing Results: I have personally reviewed pertinent reports  Patient Instructions     Panic Attack   WHAT YOU NEED TO KNOW:   A panic attack is a sudden, strong feeling of fear even though you are not in danger  You also have physical symptoms such as rapid breathing or heavy sweating   Symptoms are usually worst about 10 minutes after they start and can last up to 20 minutes  You may feel like you are having a heart attack  You may have a panic attack before an event, such as a public speech you have to give  A panic attack can also happen for no clear reason  Frequent panic attacks may be a sign of a panic disorder that needs long-term treatment  DISCHARGE INSTRUCTIONS:   Return to the emergency department if:   · You have severe chest pain, shortness of breath, or irregular heartbeats  · You have thoughts of harming yourself or another person  Contact your healthcare provider if:   · You have new or worsening panic attacks after treatment  · You have questions or concerns about your condition or care  Medicines:   · Medicines  may be given to make you feel more relaxed or to reduce anxiety that causes a panic attack  Some medicines are taken only when you are having a panic attack  Other medicines can be taken to prevent panic attacks  · Take your medicine as directed  Contact your healthcare provider if you think your medicine is not helping or if you have side effects  Tell him of her if you are allergic to any medicine  Keep a list of the medicines, vitamins, and herbs you take  Include the amounts, and when and why you take them  Bring the list or the pill bottles to follow-up visits  Carry your medicine list with you in case of an emergency  Follow up with your healthcare provider as directed:  Write down your questions so you remember to ask them during your visits  Manage or prevent a panic attack:   · Manage stress  Stress can trigger a panic attack  Yoga and meditation are good ways to help manage stress  It might be helpful to talk to someone about the stress in your life  · Exercise as directed  Exercise can reduce stress and help you sleep better  Your healthcare provider can help you create an exercise plan  · Set a sleep schedule  Too little sleep can increase anxiety   Go to bed at the same time each night and wake up at the same time each morning  Keep your room quiet and free from distractions, such as a television or computer  · Limit alcohol and caffeine  Alcohol and caffeine can both increase anxiety and make it difficult for you to sleep well  A drink of alcohol is 12 ounces of beer, 5 ounces of wine, or 1½ ounces of liquor  · Eat a variety of healthy foods  Healthy foods include fruits, vegetables, low-fat dairy products, lean meats, fish, and beans  Limit sugar  Sugar can increase your symptoms  · Do not smoke  Nicotine and other chemicals in cigarettes and cigars can increase anxiety and also cause lung damage  Ask your healthcare provider for information if you currently smoke and need help to quit  E-cigarettes or smokeless tobacco still contain nicotine  Talk to your healthcare provider before you use these products  © 2017 [address] Information is for End User's use only and may not be sold, redistributed or otherwise used for commercial purposes  All illustrations and images included in CareNotes® are the copyrighted property of A D A M [employer] Inc  or Darrell Pacheco  The above information is an  only  It is not intended as medical advice for individual conditions or treatments  Talk to your doctor, nurse or pharmacist before following any medical regimen to see if it is safe and effective for you  Weight Management   WHAT YOU NEED TO KNOW:   Being overweight increases your risk of health conditions such as heart disease, high blood pressure, type 2 diabetes, and certain types of cancer  It can also increase your risk for osteoarthritis, sleep apnea, and other respiratory problems  Aim for a slow, steady weight loss  Even a small amount of weight loss can lower your risk of health problems  DISCHARGE INSTRUCTIONS:   How to lose weight safely:  A safe and healthy way to lose weight is to eat fewer calories and get regular exercise   You can lose up about 1 pound a week by decreasing the number of calories you eat by 500 calories each day  You can decrease calories by eating smaller portion sizes or by cutting out high-calorie foods  Read labels to find out how many calories are in the foods you eat  You can also burn calories with exercise such as walking, swimming, or biking  You will be more likely to keep weight off if you make these changes part of your lifestyle  Healthy meal plan for weight management:  A healthy meal plan includes a variety of foods, contains fewer calories, and helps you stay healthy  A healthy meal plan includes the following:  · Eat whole-grain foods more often  A healthy meal plan should contain fiber  Fiber is the part of grains, fruits, and vegetables that is not broken down by your body  Whole-grain foods are healthy and provide extra fiber in your diet  Some examples of whole-grain foods are whole-wheat breads and pastas, oatmeal, brown rice, and bulgur  · Eat a variety of vegetables every day  Include dark, leafy greens such as spinach, kale, irvin greens, and mustard greens  Eat yellow and orange vegetables such as carrots, sweet potatoes, and winter squash  · Eat a variety of fruits every day  Choose fresh or canned fruit (canned in its own juice or light syrup) instead of juice  Fruit juice has very little or no fiber  · Eat low-fat dairy foods  Drink fat-free (skim) milk or 1% milk  Eat fat-free yogurt and low-fat cottage cheese  Try low-fat cheeses such as mozzarella and other reduced-fat cheeses  · Choose meat and other protein foods that are low in fat  Choose beans or other legumes such as split peas or lentils  Choose fish, skinless poultry (chicken or turkey), or lean cuts of red meat (beef or pork)  Before you cook meat or poultry, cut off any visible fat  · Use less fat and oil  Try baking foods instead of frying them   Add less fat, such as margarine, sour cream, regular salad dressing, and mayonnaise to foods  Eat fewer high-fat foods  Some examples of high-fat foods include french fries, doughnuts, ice cream, and cakes  · Eat fewer sweets  Limit foods and drinks that are high in sugar  This includes candy, cookies, regular soda, and sweetened drinks  Ways to decrease calories:   · Eat smaller portions  ¨ Use a small plate with smaller servings  ¨ Do not eat second helpings  ¨ When you eat at a restaurant, ask for a box and place half of your meal in the box before you eat  ¨ Share an entrée with someone else  · Replace high-calorie snacks with healthy, low-calorie snacks  ¨ Choose fresh fruit, vegetables, fat-free rice cakes, or air-popped popcorn instead of potato chips, nuts, or chocolate  ¨ Choose water or calorie-free drinks instead of soda or sweetened drinks  · Eat regular meals  Skipping meals can lead to overeating later in the day  Eat a healthy snack in place of a meal if you do not have time to eat a regular meal      · Do not shop for groceries when you are hungry  You may be more likely to make unhealthy food choices  Take a grocery list of healthy foods and shop after you have eaten  Exercise:  Exercise at least 30 minutes per day on most days of the week  Some examples of exercise include walking, biking, dancing, and swimming  You can also fit in more physical activity by taking the stairs instead of the elevator or parking farther away from stores  Ask your healthcare provider about the best exercise plan for you  Other things to consider as you try to lose weight:   · Be aware of situations that may give you the urge to overeat, such as eating while watching television  Find ways to avoid these situations  For example, read a book, go for a walk, or do crafts  · Meet with a weight loss support group or friends who are also trying to lose weight  This may help you stay motivated to continue working on your weight loss goals    © 2017 [facility] [facility] Information is for End User's use only and may not be sold, redistributed or otherwise used for commercial purposes  All illustrations and images included in CareNotes® are the copyrighted property of A [employer] M [employer] Inc  or Darrell Pacheco  The above information is an  only  It is not intended as medical advice for individual conditions or treatments  Talk to your doctor, nurse or pharmacist before following any medical regimen to see if it is safe and effective for you            LOVE Peter  [facility]

## 2020-07-17 NOTE — ASSESSMENT & PLAN NOTE
The patient's BMI in the office today is 30  The patient states that he has had an unexplained 30 lb weight loss in the past 3-4 months  He denies any blood in the stool or abdominal pain  The patient does not have any family history as he is adopted

## 2020-07-18 LAB
ENA SS-A AB SER-ACNC: <0.2 AI (ref 0–0.9)
ENA SS-B AB SER-ACNC: <0.2 AI (ref 0–0.9)

## 2020-07-19 LAB — HIV 1+2 AB+HIV1 P24 AG SERPL QL IA: NORMAL

## 2020-07-20 ENCOUNTER — TELEPHONE (OUTPATIENT)
Dept: INTERNAL MEDICINE CLINIC | Facility: CLINIC | Age: 40
End: 2020-07-20

## 2020-07-20 LAB — RYE IGE QN: NEGATIVE

## 2020-07-20 NOTE — TELEPHONE ENCOUNTER

## 2020-07-21 ENCOUNTER — OFFICE VISIT (OUTPATIENT)
Dept: INTERNAL MEDICINE CLINIC | Facility: CLINIC | Age: 40
End: 2020-07-21
Payer: COMMERCIAL

## 2020-07-21 ENCOUNTER — TRANSCRIBE ORDERS (OUTPATIENT)
Dept: LAB | Facility: CLINIC | Age: 40
End: 2020-07-21

## 2020-07-21 VITALS
SYSTOLIC BLOOD PRESSURE: 126 MMHG | HEART RATE: 78 BPM | DIASTOLIC BLOOD PRESSURE: 74 MMHG | HEIGHT: 70 IN | WEIGHT: 217.6 LBS | BODY MASS INDEX: 31.15 KG/M2 | RESPIRATION RATE: 16 BRPM | TEMPERATURE: 98.9 F

## 2020-07-21 DIAGNOSIS — R19.7 DIARRHEA, UNSPECIFIED TYPE: ICD-10-CM

## 2020-07-21 DIAGNOSIS — R63.4 ABNORMAL WEIGHT LOSS: ICD-10-CM

## 2020-07-21 DIAGNOSIS — R51.9 CHRONIC INTRACTABLE HEADACHE, UNSPECIFIED HEADACHE TYPE: ICD-10-CM
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
## 2020-07-21 DIAGNOSIS — R42 DIZZINESS: Primary | ICD-10-CM

## 2020-07-21 DIAGNOSIS — E78.2 MIXED HYPERLIPIDEMIA: ICD-10-CM

## 2020-07-21 DIAGNOSIS — G89.29 CHRONIC INTRACTABLE HEADACHE, UNSPECIFIED HEADACHE TYPE: ICD-10-CM

## 2020-07-21 DIAGNOSIS — M25.50 ARTHRALGIA, UNSPECIFIED JOINT: ICD-10-CM

## 2020-07-21 PROCEDURE — 3008F BODY MASS INDEX DOCD: CPT | Performed by: NURSE PRACTITIONER

## 2020-07-21 PROCEDURE — 99214 OFFICE O/P EST MOD 30 MIN: CPT | Performed by: NURSE PRACTITIONER

## 2020-07-21 PROCEDURE — 4004F PT TOBACCO SCREEN RCVD TLK: CPT | Performed by: NURSE PRACTITIONER

## 2020-07-21 NOTE — PATIENT INSTRUCTIONS
Increase lexapro to 10 mg daily  Make appointment to check vision     Good fat  Good fats come mainly from vegetables, nuts, seeds, and fish  They differ from saturated fats by having fewer hydrogen atoms bonded to their carbon chains  Healthy fats are liquid at room temperature, not solid  There are two broad categories of beneficial fats: monounsaturated and polyunsaturated fats  Monounsaturated fats  When you dip your bread in olive oil at an [place], you're getting mostly monounsaturated fat  Monounsaturated fats have a single carbon-to-carbon double bond  The result is that it has two fewer hydrogen atoms than a saturated fat and a bend at the double bond  This structure keeps monounsaturated fats liquid at room temperature  Good sources of monounsaturated fats are olive oil, peanut oil, canola oil, avocados, and most nuts, as well as high-oleic safflower and sunflower oils  The discovery that monounsaturated fat could be healthful came from the [place] Study during the 1960s  It revealed that people in [place] and other parts of the [facility] enjoyed a low rate of heart disease despite a high-fat diet  The main fat in their diet, though, was not the saturated animal fat common in countries with higher rates of heart disease  It was olive oil, which contains mainly monounsaturated fat  This finding produced a surge of interest in olive oil and the "Mediterranean diet," a style of eating regarded as a healthful choice today  Although there's no recommended daily intake of monounsaturated fats, the [place] [employer] recommends using them as much as possible along with polyunsaturated fats to replace saturated and trans fats  Polyunsaturated fats  When you pour liquid cooking oil into a pan, there's a good chance you're using polyunsaturated fat  Corn oil, sunflower oil, and safflower oil are common examples  Polyunsaturated fats are essential fats   That means they're required for normal body functions but your body can't make them  So you must get them from food  Polyunsaturated fats are used to build cell membranes and the covering of nerves  They are needed for blood clotting, muscle movement, and inflammation  A polyunsaturated fat has two or more double bonds in its carbon chain  There are two main types of polyunsaturated fats: omega-3 fatty acids and omega-6 fatty acids  The numbers refer to the distance between the beginning of the carbon chain and the first double bond  Both types offer health benefits  Eating polyunsaturated fats in place of saturated fats or highly refined carbohydrates reduces harmful LDL cholesterol and improves the cholesterol profile  It also lowers triglycerides  Good sources of omega-3 fatty acids include fatty fish such as salmon, mackerel, and sardines, flaxseeds, walnuts, canola oil, and unhydrogenated soybean oil  Omega-3 fatty acids may help prevent and even treat heart disease and stroke  In addition to reducing blood pressure, raising HDL, and lowering triglycerides, polyunsaturated fats may help prevent lethal heart rhythms from arising  Evidence also suggests they may help reduce the need for corticosteroid medications in people with rheumatoid arthritis  Studies linking omega-3s to a wide range of other health improvements, including reducing risk of dementia, are inconclusive, and some of them have major flaws, according to a systematic review of the evidence by the Agency for Healthcare Research and Quality  Omega-6 fatty acids have also been linked to protection against heart disease  Foods rich in linoleic acid and other omega-6 fatty acids include vegetable oils such as safflower, soybean, sunflower, walnut, and corn oils  Cholesterol and Your Health   AMBULATORY CARE:   Cholesterol  is a waxy, fat-like substance  Cholesterol is made by your body, but also comes from certain foods you eat   Your body uses cholesterol to make hormones and new cells  Your body also uses cholesterol to protect nerves  Cholesterol comes from foods such as meat and dairy products  Your total cholesterol level is made up by LDL cholesterol, HDL cholesterol, and triglycerides:  · LDL cholesterol  is called bad cholesterol  because it forms plaque in your arteries  As plaque builds up, your arteries become narrow, and less blood flows through  When plaque decreases blood flow to your heart, you may have chest pain  If plaque completely blocks an artery that bring blood to your heart, you may have a heart attack  Plaque can break off and form blood clots  Blood clots may block arteries in your brain and cause a stroke  · HDL cholesterol  is called good cholesterol  because it helps remove LDL cholesterol from your arteries  It does this by attaching to LDL cholesterol and carrying it to your liver  Your liver breaks down LDL cholesterol so your body can get rid of it  High levels of HDL cholesterol can help prevent a heart attack and stroke  Low levels of HDL cholesterol can increase your risk for heart disease, heart attack, and stroke  · Triglycerides  are a type of fat that store energy from foods you eat  High levels of triglycerides also cause plaque buildup  This can increase your risk for a heart attack or stroke  If your triglyceride level is high, your LDL cholesterol level may also be high  How food affects your cholesterol levels:   · Unhealthy fats  increase LDL cholesterol and triglyceride levels in your blood  They are found in foods high in cholesterol, saturated fat, and trans fat:     ¨ Cholesterol  is found in eggs, dairy, and meat  ¨ Saturated fat  is found in butter, cheese, ice cream, whole milk, and coconut oil  Saturated fat is also found in meat, such as sausage, hot dogs, and bologna  ¨ Trans fat  is found in liquid oils and is used in fried and baked foods   Foods that contain trans fats include chips, crackers, muffins, sweet rolls, microwave popcorn, and cookies  · Healthy fats,  also called unsaturated fats, help lower LDL cholesterol and triglyceride levels  Healthy fats include the following:     ¨ Monounsaturated fats  are found in foods such as olive oil, canola oil, avocado, nuts, and olives  ¨ Polyunsaturated fats,  such as omega 3 fats, are found in fish, such as salmon, trout, and tuna  They can also be found in plant foods such as flaxseed, walnuts, and soybeans  Other things that affect your cholesterol levels:   · Smoking cigarettes    · Being overweight or obese     · Drinking large amounts of alcohol    · Not enough exercise or no exercise    · Certain genes passed from your parents to you  What you need to know about having your cholesterol levels checked: Adults 21to 39years of age should have their cholesterol levels checked every 4 to 6 years  Adults 45 years and older should have their cholesterol checked every 1 to 2 years  You may need your cholesterol checked more often, or at a younger age, if you have risk factors for heart disease  You may also need to have your cholesterol checked more often if you have other health conditions, such as diabetes  Blood tests are used to check cholesterol levels  Blood tests measure your levels of triglycerides, LDL cholesterol, and HDL cholesterol  Cholesterol level goals: Your cholesterol level goal may depend on your risk for heart disease  It may also depend on your age and other health conditions  Ask your healthcare provider if the following goals are right for you:  · Your total cholesterol level  should be less than 200 mg/dL  This number may also depend on your HDL and LDL cholesterol goals  · Your LDL cholesterol level  should be less than 130 mg/dL  · Your HDL cholesterol level  should be 60 mg/dL or higher  · Your triglyceride level  should be less than 150 mg/dL    Treatment for high cholesterol:  Treatment for high cholesterol will also decrease your risk of heart disease, heart attack, and stroke  Treatment may include any of the following:  · Medicines  may be given to lower your LDL cholesterol, triglyceride levels, or total cholesterol level  You may need medicines to lower your cholesterol if any of the following is true:     ¨ You have a history of stroke, TIA, unstable angina, or a heart attack    ¨ Your LDL cholesterol level is 190 mg/dL or higher    ¨ You are age 36to 76years of age, have diabetes, and your LDL cholesterol is 70 mg/dL or higher    ¨ You are age 36to 76years of age, have risk factors for heart disease, and your LDL cholesterol is 70 mg/dL or higher    · Lifestyle changes  include changes to your diet, exercise, weight loss, and quitting smoking  It also includes decreasing the amount of alcohol you drink  · Supplements  include fish oil, red yeast rice, and garlic  Fish oil may help lower your triglyceride and LDL cholesterol levels  It may also increase your HDL cholesterol level  Red yeast rice may help decrease your total cholesterol level and LDL cholesterol level  Garlic may help lower your total cholesterol level  Do not take these supplements without talking to your healthcare provider  Nutrition to help lower your cholesterol levels:  A registered dietitian can help you create a healthy eating plan  Read food labels and choose foods low in saturated fat, trans fats, and cholesterol  · Decrease the total amount of fat you eat  Choose lean meats, fat-free or 1% fat milk, and low-fat dairy products, such as yogurt and cheese  Try to limit or avoid red meats  Limit or do not eat fried foods or baked goods such as cookies  · Replace unhealthy fats with healthy fats  Cook foods in olive oil or canola oil  Choose soft margarines that are low in saturated fat and trans fat  Seeds, nuts, and avocados are other examples of healthy fats  · Eat foods with omega-3 fats    Examples include salmon, tuna, mackerel, walnuts, and flaxseed  Eat fish 2 times per week  Children and pregnant women should not eat fish that have high levels of mercury, such as shark, swordfish, and james mackerel  · Increase the amount of plant-based foods you eat  Plant-based foods are low in cholesterol and fat  Eating more of these foods may help lower your cholesterol and help you lose weight  Examples of plant-based foods includes fruits, vegetables, legumes, and whole grains  Replace milk that contains dairy with almond, soy, or coconut milk  Eat beans and foods with soy for protein instead of meat  Ask your healthcare provider or dietitian for more information on plant-based foods  · Increase the amount of fiber you eat  High-fiber foods can help lower your LDL cholesterol  You should eat between 20 and 30 grams of fiber each day  Eat at least 5 servings of fruits and vegetables each day  Other examples of high-fiber foods include whole-grain or whole-wheat breads, pastas, or cereals, and brown rice  Eat 3 ounces of whole-grain foods each day  Increase fiber slowly  You may have abdominal discomfort, bloating, and gas if you add fiber to your diet too quickly  Lifestyle changes you can make to help lower your cholesterol levels:   · Maintain a healthy weight  Ask your healthcare provider how much you should weigh  Ask him or her to help you create a weight loss plan if you are overweight  Weight loss can decrease your total cholesterol and triglyceride levels  · Exercise regularly  Exercise can help lower your total cholesterol level and maintain a healthy weight  Exercise can also help increase your HDL cholesterol level  Work with your healthcare provider to create an exercise program that is right for you  Get at least 30 minutes of moderate exercise most days of the week  Examples of exercise include brisk walking, swimming, or biking  · Do not smoke    Nicotine and other chemicals in cigarettes and cigars can damage your lungs, heart, and blood vessels  They can also raise your triglyceride levels  Ask your healthcare provider for information if you currently smoke and need help to quit  E-cigarettes or smokeless tobacco still contain nicotine  Talk to your healthcare provider before you use these products  · Limit or do not drink alcohol  Alcohol can increase your triglyceride levels  Ask your healthcare provider if it is safe for you to drink alcohol  Also ask how much is safe for you to drink each day  © 2017 [address] Information is for End User's use only and may not be sold, redistributed or otherwise used for commercial purposes  All illustrations and images included in CareNotes® are the copyrighted property of A D A M , Inc  or Darrell Pacheco  The above information is an  only  It is not intended as medical advice for individual conditions or treatments  Talk to your doctor, nurse or pharmacist before following any medical regimen to see if it is safe and effective for you

## 2020-07-21 NOTE — ASSESSMENT & PLAN NOTE
CT scan of the abdomen and pelvis was performed 3 weeks ago which was normal   Stool for C diff was ordered

## 2020-07-21 NOTE — ASSESSMENT & PLAN NOTE
The patient states his headache continues  He has not scheduled a CT scan of the head that was ordered  I did recommend to the patient that he get this scheduled as soon as possible

## 2020-07-21 NOTE — PROGRESS NOTES
INTERNAL MEDICINE FOLLOW-UP OFFICE VISIT  [facility]    NAME: Claire Celaya  AGE: 46 y o  SEX: male    DATE OF ENCOUNTER: 7/21/2020   Assessment and Plan:     Problem List Items Addressed This Visit        Other    Dizziness - Primary       The patient's symptoms of ongoing dizziness continue  At his last visit I did order a CT the head and recommended the patient have a full eye exam   The patient did not schedule either  I did recommend to the patient that he get his CT scan performed as soon as possible to rule out any underlying pathology  Relevant Orders    Babesia microti antibody, IgG & Igm    Arthralgia       The patient states he continues with migrating arthralgia and myalgias  He states he is unable to sleep  Inflammatory markers were drawn on the patient and all were within normal limits except for his ESR which was mildly elevated  Recommend the patient schedule his CT scan  Relevant Orders    Babesia microti antibody, IgG & Igm    Chronic intractable headache       The patient states his headache continues  He has not scheduled a CT scan of the head that was ordered  I did recommend to the patient that he get this scheduled as soon as possible  Relevant Orders    Babesia microti antibody, IgG & Igm    Abnormal weight loss       CT scan of the abdomen and pelvis was performed 3 weeks ago which was normal   Stool for C diff was ordered         Relevant Orders    Babesia microti antibody, IgG & Igm    Diarrhea       The patient has a concern of ongoing diarrhea and weight loss  He believes he has lost approximately 30 lb in the last several months  He had been on at least 1 course of antibiotics back in February  Stool for C diff has been ordered for the patient           Relevant Orders    Clostridium difficile toxin by PCR    Mixed hyperlipidemia         Component Value Date/Time    CHOLESTEROL 241 (H) 07/17/2020 1032 TRIG 127 07/17/2020 1032    HDL 35 (L) 07/17/2020 1032    LDLCALC 181 (H) 07/17/2020 1032      information was provided to the patient regarding healthy fats and cholesterol management  I was unable to calculate his ASCVD risk because of his age  Patient does not know his family history for hyperlipidemia as he was adopted  I did recommend to the patient that he watches intake of fried and fatty foods and try to exercise  Other Visit Diagnoses     Tick bite, subsequent encounter        Relevant Orders    Babesia microti antibody, IgG & Igm          No follow-ups on file  Counseling:     · Medication Side Effects - Adverse side effects of medications were reviewed with the patient/guardian today: Yes  · Counseling was given regarding: Diagnostic results, Intructions for management, Importance of tx compliance and Risk factor reductions  · Barriers to treatment include: Non-adherence to prescribed treatment plan      Chief Complaint:     Chief Complaint   Patient presents with    Follow-up     labs, still not feeling well  History of Present Illness:     KRISTINA   Celiaxiang Connie to the office today for follow-up  I reviewed the patient's lab work with him  His inflammatory markers were all negative with the exception of his ESR being slightly elevated  He does have an upcoming appointment with rheumatology  He does have continued myalgias and arthralgias  He he has not been able to sleep due to this pain  The patient states that over the past several months he has pulled 3 ticks off of him that were tested positive for Lyme disease  He has always tested negative  His last Lyme panel was performed in July  He states he was in a 28 day course of doxycycline back in February  After that he states his symptoms were not improved  The patient continues with his headache  He denies any vision changes     On neuro exam was performed in the office this morning and was normal  A CT scan was ordered at his last visit the patient has not yet scheduled this scan  I did reiterate with patient the importance of getting this CAT scan and eye exam performed because of his ongoing dizziness and headaches  Patient would like to be tested for babesiosis  due to tick bites and this was ordered today  He has continued diarrhea and C diff has been ordered since the patient has been on antibiotics in the recent past   I have instructed the patient that should his pain increase or become intolerable or he has other symptoms that are worrisome he should be evaluated at the emergency room  The following portions of the patient's history were reviewed and updated as appropriate: allergies, current medications, past family history, past medical history, past social history, past surgical history and problem list      Review of Systems:     Review of Systems   Constitutional: Positive for appetite change, fatigue and unexpected weight change  HENT: Negative  Negative for congestion, postnasal drip, rhinorrhea and trouble swallowing  Eyes: Negative  Negative for visual disturbance  Respiratory: Negative  Negative for choking and shortness of breath  Cardiovascular: Negative  Negative for chest pain  Gastrointestinal: Negative  Endocrine: Negative  Genitourinary: Negative  Musculoskeletal: Positive for arthralgias and myalgias  Negative for back pain and neck pain  Skin: Negative  Neurological: Positive for headaches  Negative for dizziness  Psychiatric/Behavioral: Negative  Problem List:     Patient Active Problem List   Diagnosis    Nicotine dependence    Dizziness    Arthralgia    Chronic intractable headache    Abnormal weight loss    Panic attacks    Diarrhea    Obesity (BMI 30-39  9)        Objective:     /74 (BP Location: Left arm, Patient Position: Sitting, Cuff Size: Standard)   Pulse 78   Temp 98 9 °F (37 2 °C) (Temporal)   Resp 16   Ht 5' 10" (1 778 m) Wt 98 7 kg (217 lb 9 6 oz)   BMI 31 22 kg/m²     Physical Exam   Constitutional: He is oriented to person, place, and time  He appears well-developed and well-nourished  No distress  HENT:   Head: Normocephalic and atraumatic  Eyes: Pupils are equal, round, and reactive to light  EOM are normal    Neck: Normal range of motion  Cardiovascular: Normal rate, regular rhythm and normal heart sounds  No murmur heard  Pulmonary/Chest: Effort normal and breath sounds normal  No respiratory distress  He has no wheezes  Musculoskeletal: Normal range of motion  Neurological: He is alert and oriented to person, place, and time  He displays normal reflexes  No cranial nerve deficit or sensory deficit  He exhibits normal muscle tone  Coordination normal    Skin: Skin is warm and dry  Psychiatric: He has a normal mood and affect  His behavior is normal  Judgment and thought content normal        Pertinent Laboratory/Diagnostic Studies:    Laboratory Results: I have personally reviewed the pertinent laboratory results/reports        Current Medications:     Current Outpatient Medications   Medication Sig Dispense Refill    escitalopram (LEXAPRO) 5 mg tablet Take 1 tablet (5 mg total) by mouth daily 30 tablet 0     No current facility-administered medications for this visit  There are no Patient Instructions on file for this visit      Severa Camera, CRNP  [facility]

## 2020-07-21 NOTE — ASSESSMENT & PLAN NOTE
Component Value Date/Time    CHOLESTEROL 241 (H) 07/17/2020 1032    TRIG 127 07/17/2020 1032    HDL 35 (L) 07/17/2020 1032    LDLCALC 181 (H) 07/17/2020 1032      information was provided to the patient regarding healthy fats and cholesterol management  I was unable to calculate his ASCVD risk because of his age  Patient does not know his family history for hyperlipidemia as he was adopted  I did recommend to the patient that he watches intake of fried and fatty foods and try to exercise

## 2020-07-21 NOTE — ASSESSMENT & PLAN NOTE
The patient states he continues with migrating arthralgia and myalgias  He states he is unable to sleep  Inflammatory markers were drawn on the patient and all were within normal limits except for his ESR which was mildly elevated  Recommend the patient schedule his CT scan

## 2020-07-21 NOTE — ASSESSMENT & PLAN NOTE
The patient has a concern of ongoing diarrhea and weight loss  He believes he has lost approximately 30 lb in the last several months  He had been on at least 1 course of antibiotics back in February  Stool for C diff has been ordered for the patient

## 2020-07-21 NOTE — ASSESSMENT & PLAN NOTE
The patient's symptoms of ongoing dizziness continue  At his last visit I did order a CT the head and recommended the patient have a full eye exam   The patient did not schedule either  I did recommend to the patient that he get his CT scan performed as soon as possible to rule out any underlying pathology

## 2020-07-23 ENCOUNTER — TELEPHONE (OUTPATIENT)
Dept: RHEUMATOLOGY | Facility: CLINIC | Age: 40
End: 2020-07-23

## 2020-07-23 NOTE — TELEPHONE ENCOUNTER
Patient with recently diagnosed Lyme disease arthritis  Please get him scheduled as a new patient with any of us at the [facility]      Thanks,  HM

## 2020-07-23 NOTE — TELEPHONE ENCOUNTER
I spoke with the patient  I did offer him the next available the end of October with Dr Kwasi Manning but then he informed me he already has an appt set up with a rheumatologist for sometime sooner with someone closer to him

## 2020-07-27 NOTE — TELEPHONE ENCOUNTER
[facility]  Dr Thomson [facility] office   [phone]    Radha Cyr is calling to find out if our doctor would see him before his scheduled appt in September  Radha Cyr is going to call the patient to find out where he is going & she is unclear if she wants us to do anything further at this point

## 2020-07-27 NOTE — TELEPHONE ENCOUNTER
I spoke with Carolyn Lazo  I explained the patient does not currently have any appointments scheduled however he was offered an appt for the end of October, in which he stated he had an appt scheduled sooner with another doctor  She stated she is already aware of that

## 2020-07-27 NOTE — TELEPHONE ENCOUNTER
Please get back to Samuel Underwood, if there really does not seem like we can accommodate him before Sept , have him keep his appt with [facility]      Thanks,  HM

## 2020-10-02 ENCOUNTER — TELEPHONE (OUTPATIENT)
Dept: INTERNAL MEDICINE CLINIC | Facility: CLINIC | Age: 40
End: 2020-10-02

## 2020-12-04 ENCOUNTER — TELEMEDICINE (OUTPATIENT)
Dept: INTERNAL MEDICINE CLINIC | Facility: CLINIC | Age: 40
End: 2020-12-04
Payer: COMMERCIAL

## 2020-12-04 DIAGNOSIS — F41.0 PANIC ATTACKS: ICD-10-CM

## 2020-12-04 DIAGNOSIS — A69.23 LYME ARTHRITIS (HCC): Primary | ICD-10-CM

## 2020-12-04 PROCEDURE — 4004F PT TOBACCO SCREEN RCVD TLK: CPT | Performed by: NURSE PRACTITIONER

## 2020-12-04 PROCEDURE — 99213 OFFICE O/P EST LOW 20 MIN: CPT | Performed by: NURSE PRACTITIONER

## 2020-12-04 RX ORDER — GABAPENTIN 100 MG/1
100 CAPSULE ORAL 3 TIMES DAILY
Qty: 90 CAPSULE | Refills: 0 | Status: SHIPPED | OUTPATIENT
Start: 2020-12-04

## 2020-12-07 ENCOUNTER — TELEPHONE (OUTPATIENT)
Dept: INTERNAL MEDICINE CLINIC | Facility: CLINIC | Age: 40
End: 2020-12-07

## 2021-03-16 ENCOUNTER — OFFICE VISIT (OUTPATIENT)
Dept: URGENT CARE | Facility: CLINIC | Age: 41
End: 2021-03-16
Payer: COMMERCIAL

## 2021-03-16 VITALS
HEIGHT: 70 IN | WEIGHT: 225 LBS | BODY MASS INDEX: 32.21 KG/M2 | RESPIRATION RATE: 18 BRPM | SYSTOLIC BLOOD PRESSURE: 130 MMHG | OXYGEN SATURATION: 97 % | DIASTOLIC BLOOD PRESSURE: 82 MMHG | HEART RATE: 78 BPM | TEMPERATURE: 98.2 F

## 2021-03-16 DIAGNOSIS — S61.215A LACERATION OF LEFT RING FINGER WITHOUT FOREIGN BODY WITHOUT DAMAGE TO NAIL, INITIAL ENCOUNTER: Primary | ICD-10-CM

## 2021-03-16 PROCEDURE — 99213 OFFICE O/P EST LOW 20 MIN: CPT | Performed by: PHYSICIAN ASSISTANT

## 2021-03-16 PROCEDURE — S9088 SERVICES PROVIDED IN URGENT: HCPCS | Performed by: PHYSICIAN ASSISTANT

## 2021-03-16 PROCEDURE — 12002 RPR S/N/AX/GEN/TRNK2.6-7.5CM: CPT | Performed by: PHYSICIAN ASSISTANT

## 2021-03-16 RX ORDER — CEPHALEXIN 500 MG/1
500 CAPSULE ORAL EVERY 6 HOURS SCHEDULED
Qty: 20 CAPSULE | Refills: 0 | Status: SHIPPED | OUTPATIENT
Start: 2021-03-16 | End: 2021-03-21

## 2021-03-16 NOTE — PROGRESS NOTES
[address]        NAME: Jevon Omalley is a 45 y o  male  : 1980    MRN: [id number]  DATE: 2021  TIME: 11:04 AM    Assessment and Plan   Laceration of left ring finger without foreign body without damage to nail, initial encounter [S61 215A]  1  Laceration of left ring finger without foreign body without damage to nail, initial encounter  cephalexin (KEFLEX) 500 mg capsule         Patient Instructions     Begin antibiotic as directed  Take with probiotic or yogurt to prevent stomach upset  Apply Neosporin or antibiotic ointment to the affected wound the next 2 days  Keep the area covered while working with debris  follow-up with primary care physician in 3-5 days for wound check  Have sutures removed in 7-10 days continue to keep the area clean and dry  Look out for signs of infection including increased redness, warmth, drainage, worsening pain, fever or chills  Follow up with PCP in 3-5 days  Proceed to  ER if symptoms worsen  Chief Complaint     Chief Complaint   Patient presents with    Hand Laceration     Patient is self employed and was working when he slid his hand across a sharp tile and cut his lt ring finger  Applied steristrips but would not stop bleeding  History of Present Illness       45year old male presents to the office for c/o of left ring finger laceration that occurred for him about 30 minutes prior to arrival to urgent care  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 he slipped his hand along some tile  A short piece lacerated the palmar distal aspect of his left ring finger  Patient did not notice the pain as he has chronic sensation changes in his fingers on his left hand due to a circular saw accident that happened for him approximately 20 years ago  Patient denies any sensation changes since his new laceration today  He denies any difficulty with movement of the finger    He is not taking any blood thinners  He is up-to-date on tetanus last receiving tetanus booster in 2018  Patient notes that he did try to Steri-Strips the wound together however it continued to bleed which is why he is here today  Hand Injury   The incident occurred less than 1 hour ago  Incident location: at a job site - patient is a Contractor self-employed  Injury mechanism: scraped finger on a small Piece of porcelain tile  Pain location: L right finger  The quality of the pain is described as aching  The pain is at a severity of 2/10  The pain is mild  Associated symptoms include numbness (chronic sine the injury itself)  Pertinent negatives include no chest pain  Nothing aggravates the symptoms  He has tried nothing (steri stripping the area together ) for the symptoms  The treatment provided mild relief  Review of Systems   Review of Systems   Constitutional: Negative for chills and fatigue  Respiratory: Negative for shortness of breath  Cardiovascular: Negative for chest pain  Gastrointestinal: Negative for nausea  Musculoskeletal: Positive for myalgias  Negative for arthralgias, gait problem and joint swelling  Skin: Positive for wound  Neurological: Positive for numbness (chronic sine the injury itself)           Current Medications       Current Outpatient Medications:     cephalexin (KEFLEX) 500 mg capsule, Take 1 capsule (500 mg total) by mouth every 6 (six) hours for 5 days, Disp: 20 capsule, Rfl: 0    gabapentin (NEURONTIN) 100 mg capsule, Take 1 capsule (100 mg total) by mouth 3 (three) times a day, Disp: 90 capsule, Rfl: 0    Current Allergies     Allergies as of 03/16/2021    (No Known Allergies)            The following portions of the patient's history were reviewed and updated as appropriate: allergies, current medications, past family history, past medical history, past social history, past surgical history and problem list      Past Medical History:   Diagnosis Date    Lyme disease     Panic attack     Recovering alcoholic [facility])        Past Surgical History:   Procedure Laterality Date    ROOT CANAL         Family History   Adopted: Yes         Medications have been verified  Objective   /82   Pulse 78   Temp 98 2 °F (36 8 °C)   Resp 18   Ht 5' 10" (1 778 m)   Wt 102 kg (225 lb)   SpO2 97%   BMI 32 28 kg/m²   No LMP for male patient  Physical Exam     Physical Exam  Constitutional:       General: He is not in acute distress  Appearance: He is well-developed  He is not ill-appearing or diaphoretic  HENT:      Head: Normocephalic and atraumatic  Right Ear: Hearing and external ear normal       Left Ear: Hearing and external ear normal       Nose: Nose normal  No mucosal edema or rhinorrhea  Mouth/Throat:      Pharynx: Uvula midline  No oropharyngeal exudate, posterior oropharyngeal erythema or uvula swelling  Eyes:      General: Lids are normal       Conjunctiva/sclera: Conjunctivae normal    Cardiovascular:      Rate and Rhythm: Normal rate and regular rhythm  Heart sounds: Normal heart sounds, S1 normal and S2 normal  No murmur  Pulmonary:      Effort: Pulmonary effort is normal  No respiratory distress  Breath sounds: Normal breath sounds  No decreased breath sounds  Abdominal:      General: Abdomen is flat  Musculoskeletal:      Left hand: He exhibits tenderness (over the wound ), laceration and swelling (localized over the laceration )  He exhibits normal range of motion and normal capillary refill  Normal strength noted  Hands:       Comments: AROM of fingers of left hand WNL  Sensation to crude touch to fingertip  Cap refill less than 2 seconds    Skin:     General: Skin is warm and dry  Capillary Refill: Capillary refill takes less than 2 seconds  Findings: Signs of injury and laceration present  No rash  Neurological:      General: No focal deficit present  Mental Status: He is alert     Psychiatric: Behavior: Behavior is cooperative  Laceration repair    Date/Time: 3/16/2021 10:58 AM  Performed by: Sachi Seymour PA-C  Authorized by: Sachi Seymour PA-C   Consent given by: patient  Patient identity confirmed: verbally with patient  Time out: Immediately prior to procedure a "time out" was called to verify the correct patient, procedure, equipment, support staff and site/side marked as required  Body area: upper extremity  Location details: left ring finger  Laceration length: 3 5 cm  Foreign bodies: no foreign bodies  Tendon involvement: none  Nerve involvement: none  Vascular damage: yes (small superifical vessel on the palmar aspect  silver nitrate stick was used to Cauterize vessel prior to suturing)  Anesthesia: local infiltration    Anesthesia:  Local Anesthetic: lidocaine 1% without epinephrine (lot [id number] exp 06/2023)  Anesthetic total: 2 mL    Sedation:  Patient sedated: no      Wound Dehiscence:  Superficial Wound Dehiscence: simple closure      Procedure Details:  Preparation: Patient was prepped and draped in the usual sterile fashion    Irrigation solution: saline  Irrigation method: syringe  Amount of cleaning: standard  Debridement: none  Degree of undermining: none  Skin closure: 5-0 nylon  Number of sutures: 6  Technique: simple  Approximation: close  Approximation difficulty: simple  Dressing: antibiotic ointment and gauze roll  Patient tolerance: patient tolerated the procedure well with no immediate complications  Foreign body: no FB or debris on cleaning of the wound

## 2021-03-16 NOTE — PATIENT INSTRUCTIONS
Begin antibiotic as directed  Take with probiotic or yogurt to prevent stomach upset  Apply Neosporin or antibiotic ointment to the affected wound the next 2 days  Keep the area covered while working with debris  follow-up with primary care physician in 3-5 days for wound check  Have sutures removed in 7-10 days continue to keep the area clean and dry  Look out for signs of infection including increased redness, warmth, drainage, worsening pain, fever or chills  Laceration   WHAT YOU NEED TO KNOW:   A laceration is an injury to the skin and the soft tissue underneath it  Lacerations can happen anywhere on the body  DISCHARGE INSTRUCTIONS:   Return to the emergency department if:   · You have heavy bleeding or bleeding that does not stop after 10 minutes of holding firm, direct pressure over the wound  · Your wound opens up  Call your doctor if:   · You have a fever or chills  · Your laceration is red, warm, or swollen  · You have red streaks on your skin coming from your wound  · You have white or yellow drainage from the wound that smells bad  · You have pain that gets worse, even after treatment  · You have questions or concerns about your condition or care  Medicines: You may need any of the following:  · Prescription pain medicine  may be given  Ask your healthcare provider how to take this medicine safely  Some prescription pain medicines contain acetaminophen  Do not take other medicines that contain acetaminophen without talking to your healthcare provider  Too much acetaminophen may cause liver damage  Prescription pain medicine may cause constipation  Ask your healthcare provider how to prevent or treat constipation  · Antibiotics  help treat or prevent a bacterial infection  · Take your medicine as directed  Contact your healthcare provider if you think your medicine is not helping or if you have side effects   Tell him or her if you are allergic to any medicine  Keep a list of the medicines, vitamins, and herbs you take  Include the amounts, and when and why you take them  Bring the list or the pill bottles to follow-up visits  Carry your medicine list with you in case of an emergency  Care for your wound as directed:   · Do not get your wound wet  until your healthcare provider says it is okay  Do not soak your wound in water  Do not go swimming until your healthcare provider says it is okay  Carefully wash the wound with soap and water  Gently pat the area dry or allow it to air dry  · Change your bandages  when they get wet, dirty, or after washing  Apply new, clean bandages as directed  Do not apply elastic bandages or tape too tight  Do not put powders or lotions over your incision  · Apply antibiotic ointment as directed  Your healthcare provider may give you antibiotic ointment to put over your wound if you have stitches  If you have strips of tape over your incision, let them dry up and fall off on their own  If they do not fall off within 14 days, gently remove them  If you have glue over your wound, do not remove or pick at it  If your glue comes off, do not replace it with glue that you have at home  · Check your wound every day for signs of infection, such as swelling, redness, or pus  Self-care:   · Apply ice  on your wound for 15 to 20 minutes every hour or as directed  Use an ice pack, or put crushed ice in a plastic bag  Cover it with a towel  Ice helps prevent tissue damage and decreases swelling and pain  · Use a splint as directed  A splint will decrease movement and stress on your wound  It may help it heal faster  A splint may be used for lacerations over joints or areas of your body that bend  Ask your healthcare provider how to apply and remove a splint  · Decrease scarring of your wound  by applying ointments as directed  Do not apply ointments until your healthcare provider says it is okay   You may need to wait until your wound is healed  Ask which ointment to buy and how often to use it  After your wound is healed, use sunscreen over the area when you are out in the sun  You should do this for at least 6 months to 1 year after your injury  Follow up with your doctor as directed: You may need to follow up in 24 to 48 hours to have your wound checked for infection  You will need to return in 3 to 14 days if you have stitches or staples so they can be removed  Care for your wound as directed to prevent infection and help it heal  Write down your questions so you remember to ask them during your visits  © Copyright [address] Information is for End User's use only and may not be sold, redistributed or otherwise used for commercial purposes  All illustrations and images included in CareNotes® are the copyrighted property of [employer]  or [employer]   The above information is an  only  It is not intended as medical advice for individual conditions or treatments  Talk to your doctor, nurse or pharmacist before following any medical regimen to see if it is safe and effective for you  Care For Your Stitches   WHAT YOU NEED TO KNOW:   Stitches, or sutures, are used to close cuts and wounds on the skin  Stitches need to be removed after your wound has healed  DISCHARGE INSTRUCTIONS:   Return to the emergency department if:   · Your stitches come apart  · Blood soaks through your bandages  · You suddenly cannot move your injured joint  · You have sudden numbness around your wound  · You see red streaks coming from your wound  Contact your healthcare provider if:   · You have a fever and chills  · Your wound is red, warm, swollen, or leaking pus  · There is a bad smell coming from your wound  · You have increased pain in the wound area  · You have questions or concerns about your condition or care  Care for your stitches:   · Protect the stitches    You may need to cover your stitches with a bandage for 24 to 48 hours, or as directed  Do not bump or hit the suture area  This could open the wound  Do not trim or shorten the ends of your stitches  If they rub on your clothing, put a gauze bandage between the stitches and your clothes  · Clean the area as directed  Carefully wash your wound with soap and water  For mouth and lip wounds, rinse your mouth after meals and at bedtime  Ask your healthcare provider what to use to rinse your mouth  If you have a scalp wound, you may gently wash your hair every 2 days with mild shampoo  Do not use hair products, such as hair spray  Check your wound for signs of infection when you clean it  Signs include redness, swelling, and pus  · Keep the area dry as directed  Wait 12 to 24 hours after you receive your stitches before you take a shower  Take showers instead of baths  Do not take a bath or swim  Your healthcare provider will give you instructions for bathing with your stitches  Help your wound heal:   · Elevate your wound  Keep your wound above the level of your heart as often as you can  This will help decrease swelling and pain  Prop the area on pillows or blankets, if possible, to keep it elevated comfortably  · Limit activity  Do not stretch the skin around your wound  This will help prevent bleeding and swelling  Follow up with your healthcare provider as directed: You may need to return to have your stitches removed  Write down your questions so you remember to ask them during your visits  © Copyright [address] Information is for End User's use only and may not be sold, redistributed or otherwise used for commercial purposes  All illustrations and images included in CareNotes® are the copyrighted property of [employer]  or [facility] Kaleb Terry   The above information is an  only  It is not intended as medical advice for individual conditions or treatments   Talk to your doctor, nurse or pharmacist before following any medical regimen to see if it is safe and effective for you

## 2021-07-13 ENCOUNTER — APPOINTMENT (EMERGENCY)
Dept: CT IMAGING | Facility: HOSPITAL | Age: 41
End: 2021-07-13
Payer: COMMERCIAL

## 2021-07-13 ENCOUNTER — HOSPITAL ENCOUNTER (OUTPATIENT)
Facility: HOSPITAL | Age: 41
Setting detail: OBSERVATION
Discharge: HOME | End: 2021-07-14
Attending: EMERGENCY MEDICINE | Admitting: FAMILY MEDICINE
Payer: COMMERCIAL

## 2021-07-13 DIAGNOSIS — M54.2 NECK PAIN: ICD-10-CM

## 2021-07-13 DIAGNOSIS — R42 VERTIGO: Primary | ICD-10-CM

## 2021-07-13 DIAGNOSIS — R07.9 CHEST PAIN: ICD-10-CM

## 2021-07-13 LAB
ALBUMIN SERPL BCP-MCNC: 4.4 G/DL (ref 3–5.2)
ALP SERPL-CCNC: 55 U/L (ref 43–122)
ALT SERPL W P-5'-P-CCNC: 37 U/L
ANION GAP SERPL CALCULATED.3IONS-SCNC: 8 MMOL/L (ref 5–14)
APTT PPP: 31 SECONDS (ref 23–37)
AST SERPL W P-5'-P-CCNC: 34 U/L (ref 17–59)
ATRIAL RATE: 75 BPM
BASOPHILS # BLD AUTO: 0.1 THOUSANDS/ΜL (ref 0–0.1)
BASOPHILS NFR BLD AUTO: 1 % (ref 0–1)
BILIRUB SERPL-MCNC: 0.66 MG/DL
BUN SERPL-MCNC: 12 MG/DL (ref 5–25)
CALCIUM SERPL-MCNC: 9.5 MG/DL (ref 8.4–10.2)
CHLORIDE SERPL-SCNC: 103 MMOL/L (ref 97–108)
CK SERPL-CCNC: 135 U/L (ref 55–170)
CO2 SERPL-SCNC: 27 MMOL/L (ref 22–30)
CREAT SERPL-MCNC: 0.8 MG/DL (ref 0.7–1.5)
EOSINOPHIL # BLD AUTO: 0.5 THOUSAND/ΜL (ref 0–0.4)
EOSINOPHIL NFR BLD AUTO: 5 % (ref 0–6)
ERYTHROCYTE [DISTWIDTH] IN BLOOD BY AUTOMATED COUNT: 13.6 %
GFR SERPL CREATININE-BSD FRML MDRD: 113 ML/MIN/1.73SQ M
GLUCOSE SERPL-MCNC: 84 MG/DL (ref 70–99)
HCT VFR BLD AUTO: 43.3 % (ref 41–53)
HGB BLD-MCNC: 15.1 G/DL (ref 13.5–17.5)
INR PPP: 1.06 (ref 0.84–1.19)
LIPASE SERPL-CCNC: 51 U/L (ref 23–300)
LYMPHOCYTES # BLD AUTO: 2.4 THOUSANDS/ΜL (ref 0.5–4)
LYMPHOCYTES NFR BLD AUTO: 22 % (ref 25–45)
MAGNESIUM SERPL-MCNC: 2.1 MG/DL (ref 1.6–2.3)
MCH RBC QN AUTO: 30.7 PG (ref 26–34)
MCHC RBC AUTO-ENTMCNC: 34.8 G/DL (ref 31–36)
MCV RBC AUTO: 88 FL (ref 80–100)
MONOCYTES # BLD AUTO: 0.7 THOUSAND/ΜL (ref 0.2–0.9)
MONOCYTES NFR BLD AUTO: 7 % (ref 1–10)
NEUTROPHILS # BLD AUTO: 7.1 THOUSANDS/ΜL (ref 1.8–7.8)
NEUTS SEG NFR BLD AUTO: 66 % (ref 45–65)
P AXIS: 50 DEGREES
PHOSPHATE SERPL-MCNC: 3.2 MG/DL (ref 2.5–4.8)
PLATELET # BLD AUTO: 260 THOUSANDS/UL (ref 150–450)
PMV BLD AUTO: 7.1 FL (ref 8.9–12.7)
POTASSIUM SERPL-SCNC: 3.7 MMOL/L (ref 3.6–5)
PR INTERVAL: 160 MS
PROT SERPL-MCNC: 7.3 G/DL (ref 5.9–8.4)
PROTHROMBIN TIME: 13.9 SECONDS (ref 11.6–14.5)
QRS AXIS: 60 DEGREES
QRSD INTERVAL: 92 MS
QT INTERVAL: 388 MS
QTC INTERVAL: 433 MS
RBC # BLD AUTO: 4.9 MILLION/UL (ref 4.5–5.9)
SODIUM SERPL-SCNC: 138 MMOL/L (ref 137–147)
T WAVE AXIS: 47 DEGREES
TROPONIN I SERPL-MCNC: <0.01 NG/ML (ref 0–0.03)
TSH SERPL DL<=0.05 MIU/L-ACNC: 3 UIU/ML (ref 0.47–4.68)
VENTRICULAR RATE: 75 BPM
WBC # BLD AUTO: 10.8 THOUSAND/UL (ref 4.5–11)

## 2021-07-13 PROCEDURE — 70496 CT ANGIOGRAPHY HEAD: CPT

## 2021-07-13 PROCEDURE — 96374 THER/PROPH/DIAG INJ IV PUSH: CPT

## 2021-07-13 PROCEDURE — 85730 THROMBOPLASTIN TIME PARTIAL: CPT | Performed by: PHYSICIAN ASSISTANT

## 2021-07-13 PROCEDURE — 96361 HYDRATE IV INFUSION ADD-ON: CPT

## 2021-07-13 PROCEDURE — 83735 ASSAY OF MAGNESIUM: CPT | Performed by: PHYSICIAN ASSISTANT

## 2021-07-13 PROCEDURE — 99285 EMERGENCY DEPT VISIT HI MDM: CPT

## 2021-07-13 PROCEDURE — 96375 TX/PRO/DX INJ NEW DRUG ADDON: CPT

## 2021-07-13 PROCEDURE — 85025 COMPLETE CBC W/AUTO DIFF WBC: CPT | Performed by: PHYSICIAN ASSISTANT

## 2021-07-13 PROCEDURE — 84484 ASSAY OF TROPONIN QUANT: CPT | Performed by: PHYSICIAN ASSISTANT

## 2021-07-13 PROCEDURE — 70498 CT ANGIOGRAPHY NECK: CPT

## 2021-07-13 PROCEDURE — G1004 CDSM NDSC: HCPCS

## 2021-07-13 PROCEDURE — 85610 PROTHROMBIN TIME: CPT | Performed by: PHYSICIAN ASSISTANT

## 2021-07-13 PROCEDURE — 93005 ELECTROCARDIOGRAM TRACING: CPT

## 2021-07-13 PROCEDURE — 36415 COLL VENOUS BLD VENIPUNCTURE: CPT | Performed by: PHYSICIAN ASSISTANT

## 2021-07-13 PROCEDURE — 84443 ASSAY THYROID STIM HORMONE: CPT | Performed by: PHYSICIAN ASSISTANT

## 2021-07-13 PROCEDURE — 84100 ASSAY OF PHOSPHORUS: CPT | Performed by: PHYSICIAN ASSISTANT

## 2021-07-13 PROCEDURE — 83690 ASSAY OF LIPASE: CPT | Performed by: PHYSICIAN ASSISTANT

## 2021-07-13 PROCEDURE — 82550 ASSAY OF CK (CPK): CPT | Performed by: PHYSICIAN ASSISTANT

## 2021-07-13 PROCEDURE — 99285 EMERGENCY DEPT VISIT HI MDM: CPT | Performed by: PHYSICIAN ASSISTANT

## 2021-07-13 PROCEDURE — 80053 COMPREHEN METABOLIC PANEL: CPT | Performed by: PHYSICIAN ASSISTANT

## 2021-07-13 PROCEDURE — 93010 ELECTROCARDIOGRAM REPORT: CPT | Performed by: INTERNAL MEDICINE

## 2021-07-13 RX ORDER — MECLIZINE HCL 12.5 MG/1
12.5 TABLET ORAL ONCE
Status: COMPLETED | OUTPATIENT
Start: 2021-07-13 | End: 2021-07-13

## 2021-07-13 RX ORDER — MECLIZINE HCL 12.5 MG/1
25 TABLET ORAL EVERY 8 HOURS PRN
Status: DISCONTINUED | OUTPATIENT
Start: 2021-07-13 | End: 2021-07-14 | Stop reason: HOSPADM

## 2021-07-13 RX ORDER — KETOROLAC TROMETHAMINE 30 MG/ML
15 INJECTION, SOLUTION INTRAMUSCULAR; INTRAVENOUS ONCE
Status: COMPLETED | OUTPATIENT
Start: 2021-07-13 | End: 2021-07-13

## 2021-07-13 RX ADMIN — KETOROLAC TROMETHAMINE 15 MG: 30 INJECTION, SOLUTION INTRAMUSCULAR; INTRAVENOUS at 20:31

## 2021-07-13 RX ADMIN — MECLIZINE 12.5 MG: 12.5 TABLET ORAL at 20:31

## 2021-07-13 RX ADMIN — SODIUM CHLORIDE 1000 ML: 0.9 INJECTION, SOLUTION INTRAVENOUS at 19:04

## 2021-07-13 RX ADMIN — IOHEXOL 100 ML: 350 INJECTION, SOLUTION INTRAVENOUS at 19:41

## 2021-07-13 RX ADMIN — MORPHINE SULFATE 2 MG: 2 INJECTION, SOLUTION INTRAMUSCULAR; INTRAVENOUS at 19:04

## 2021-07-13 NOTE — ED PROVIDER NOTES
History  Chief Complaint   Patient presents with    Chest Pain     started 2 hrs ago, blurry vision    Dizziness     seing a neurologist for the recurring symptoms, starts stutering      67-year-old male with past medical history alcoholism in recovery presenting for evaluation of blurry vision  Patient states that he had dizziness/blurry vision starting this morning then started with electric pain in the back of his neck, chest pain and bilateral feet pain  Patient states that this has been happening on and off for the last 2 years  Today the blurry vision came on while he was driving  Patient states the blurry vision is constant but the pain in his neck chest and feet coming go  Patient has seen the neurologist and rheumatologist without any answers as to what is causing the symptoms  He denies any complete loss of vision  Denies any headache, abdominal pain, n/v/d  Prior to Admission Medications   Prescriptions Last Dose Informant Patient Reported? Taking?   gabapentin (NEURONTIN) 100 mg capsule Not Taking at Unknown time  No No   Sig: Take 1 capsule (100 mg total) by mouth 3 (three) times a day   Patient not taking: Reported on 7/13/2021      Facility-Administered Medications: None       Past Medical History:   Diagnosis Date    Lyme disease     Panic attack     Recovering alcoholic ([facility] [address] )        Past Surgical History:   Procedure Laterality Date    ROOT CANAL         Family History   Adopted: Yes     I have reviewed and agree with the history as documented      E-Cigarette/Vaping    E-Cigarette Use Never User      E-Cigarette/Vaping Substances    Nicotine No     THC No     CBD No     Flavoring No     Other No     Unknown No      Social History     Tobacco Use    Smoking status: Current Every Day Smoker     Packs/day: 1 00     Years: 25 00     Pack years: 25 00     Types: Cigarettes    Smokeless tobacco: Never Used   Vaping Use    Vaping Use: Never used   Substance Use Topics    Alcohol use: No    Drug use: Yes     Types: Marijuana       Review of Systems   All other systems reviewed and are negative  Physical Exam  Physical Exam  Vitals and nursing note reviewed  Constitutional:       General: He is not in acute distress  Appearance: He is well-developed  He is not ill-appearing, toxic-appearing or diaphoretic  Comments: Pt is stuttering   HENT:      Head: Normocephalic and atraumatic  Eyes:      Conjunctiva/sclera: Conjunctivae normal       Pupils: Pupils are equal, round, and reactive to light  Comments: EOM grossly intact, no nystagmus   Neck:      Vascular: No JVD  Cardiovascular:      Rate and Rhythm: Normal rate  Heart sounds: Normal heart sounds  No systolic murmur is present  No friction rub  Pulmonary:      Effort: Pulmonary effort is normal       Breath sounds: Normal breath sounds  Abdominal:      Palpations: Abdomen is soft  Musculoskeletal:         General: Normal range of motion  Cervical back: Normal range of motion and neck supple  No tenderness (no tenderness midline or paraspinal)  Right lower leg: No tenderness  No edema  Left lower leg: No tenderness  No edema  Skin:     General: Skin is warm and dry  Capillary Refill: Capillary refill takes less than 2 seconds  Neurological:      Mental Status: He is alert and oriented to person, place, and time  GCS: GCS eye subscore is 4  GCS verbal subscore is 5  GCS motor subscore is 6  Motor: Weakness (b/l LE) present        Gait: Gait abnormal    Psychiatric:         Behavior: Behavior normal          Vital Signs  ED Triage Vitals   Temperature Pulse Respirations Blood Pressure SpO2   07/13/21 1809 07/13/21 1809 07/13/21 1809 07/13/21 1809 07/13/21 1809   (!) 96 8 °F (36 °C) 87 17 154/90 98 %      Temp Source Heart Rate Source Patient Position - Orthostatic VS BP Location FiO2 (%)   07/13/21 1809 07/13/21 1809 07/13/21 1809 07/13/21 1830 --   Tympanic Monitor Sitting Left arm       Pain Score       07/13/21 1809       7           Vitals:    07/13/21 1809 07/13/21 1830 07/13/21 1845 07/13/21 1900   BP: 154/90 135/77 129/72 126/71   Pulse: 87 67 71 69   Patient Position - Orthostatic VS: Sitting Lying Lying Lying         Visual Acuity      ED Medications  Medications   sodium chloride 0 9 % bolus 1,000 mL (1,000 mL Intravenous New Bag 7/13/21 1904)   morphine injection 2 mg (2 mg Intravenous Given 7/13/21 1904)   iohexol (OMNIPAQUE) 350 MG/ML injection (MULTI-DOSE) 100 mL (100 mL Intravenous Given 7/13/21 1941)   meclizine (ANTIVERT) tablet 12 5 mg (12 5 mg Oral Given 7/13/21 2031)   ketorolac (TORADOL) injection 15 mg (15 mg Intravenous Given 7/13/21 2031)       Diagnostic Studies  Results Reviewed     Procedure Component Value Units Date/Time    TSH [[id number]]  (Normal) Collected: 07/13/21 1855    Lab Status: Final result Specimen: Blood from Arm, Right Updated: 07/13/21 1954     TSH 3RD GENERATON 3 000 uIU/mL     Narrative:      Patients undergoing fluorescein dye angiography may retain small amounts of fluorescein in the body for 48-72 hours post procedure  Samples containing fluorescein can produce falsely depressed TSH values  If the patient had this procedure,a specimen should be resubmitted post fluorescein clearance        Troponin I [[id number]]  (Normal) Collected: 07/13/21 1852    Lab Status: Final result Specimen: Blood from Arm, Right Updated: 07/13/21 1930     Troponin I <0 01 ng/mL     Lipase [[id number]]  (Normal) Collected: 07/13/21 1855    Lab Status: Final result Specimen: Blood from Arm, Right Updated: 07/13/21 1925     Lipase 51 u/L     Phosphorus [[id number]]  (Normal) Collected: 07/13/21 1855    Lab Status: Final result Specimen: Blood from Arm, Right Updated: 07/13/21 1925     Phosphorus 3 2 mg/dL     Magnesium [[id number]]  (Normal) Collected: 07/13/21 1855    Lab Status: Final result Specimen: Blood from Arm, Right Updated: 07/13/21 1925 Magnesium 2 1 mg/dL     CK (with reflex to MB) [[id number]]  (Normal) Collected: 07/13/21 1855    Lab Status: Final result Specimen: Blood from Arm, Right Updated: 07/13/21 1925     Total  U/L     Comprehensive metabolic panel [[id number]]  (Normal) Collected: 07/13/21 1855    Lab Status: Final result Specimen: Blood from Arm, Right Updated: 07/13/21 1925     Sodium 138 mmol/L      Potassium 3 7 mmol/L      Chloride 103 mmol/L      CO2 27 mmol/L      ANION GAP 8 mmol/L      BUN 12 mg/dL      Creatinine 0 80 mg/dL      Glucose 84 mg/dL      Calcium 9 5 mg/dL      AST 34 U/L      ALT 37 U/L      Alkaline Phosphatase 55 U/L      Total Protein 7 3 g/dL      Albumin 4 4 g/dL      Total Bilirubin 0 66 mg/dL      eGFR 113 ml/min/1 73sq m     Narrative:      [place] guidelines for Chronic Kidney Disease (CKD):     Stage 1 with normal or high GFR (GFR > 90 mL/min/1 73 square meters)    Stage 2 Mild CKD (GFR = 60-89 mL/min/1 73 square meters)    Stage 3A Moderate CKD (GFR = 45-59 mL/min/1 73 square meters)    Stage 3B Moderate CKD (GFR = 30-44 mL/min/1 73 square meters)    Stage 4 Severe CKD (GFR = 15-29 mL/min/1 73 square meters)    Stage 5 End Stage CKD (GFR <15 mL/min/1 73 square meters)  Note: GFR calculation is accurate only with a steady state creatinine    Protime-INR [[id number]]  (Normal) Collected: 07/13/21 1852    Lab Status: Final result Specimen: Blood from Arm, Right Updated: 07/13/21 1918     Protime 13 9 seconds      INR 1 06    APTT [[id number]]  (Normal) Collected: 07/13/21 1852    Lab Status: Final result Specimen: Blood from Arm, Right Updated: 07/13/21 1918     PTT 31 seconds     CBC and differential [[id number]]  (Abnormal) Collected: 07/13/21 1852    Lab Status: Final result Specimen: Blood from Arm, Right Updated: 07/13/21 1914     WBC 10 80 Thousand/uL      RBC 4 90 Million/uL      Hemoglobin 15 1 g/dL      Hematocrit 43 3 %      MCV 88 fL      MCH 30 7 pg      MCHC 34 8 g/dL      RDW 13 6 %      MPV 7 1 fL      Platelets 417 Thousands/uL      Neutrophils Relative 66 %      Lymphocytes Relative 22 %      Monocytes Relative 7 %      Eosinophils Relative 5 %      Basophils Relative 1 %      Neutrophils Absolute 7 10 Thousands/µL      Lymphocytes Absolute 2 40 Thousands/µL      Monocytes Absolute 0 70 Thousand/µL      Eosinophils Absolute 0 50 Thousand/µL      Basophils Absolute 0 10 Thousands/µL     UA w Reflex to Microscopic w Reflex to Culture [[id number]]     Lab Status: No result Specimen: Urine, Other                  CTA head and neck with and without contrast   Final Result by Cynthia Rothman MD (07/13 2013)         1  No evidence of acute intracranial hemorrhage  2  No evidence of hemodynamic significant stenosis, aneurysm or dissection  Workstation performed: [id number]                    Procedures  ECG 12 Lead Documentation Only    Date/Time: 7/13/2021 6:21 PM  Performed by: Shoaib Rutherford PA-C  Authorized by: Shoaib Rutherford PA-C     Indications / Diagnosis:  Dizziness  ECG reviewed by me, the ED Provider: yes    Patient location:  ED  Interpretation:     Interpretation: normal    Rate:     ECG rate:  75    ECG rate assessment: normal    Rhythm:     Rhythm: sinus rhythm    QRS:     QRS axis:  Normal  Conduction:     Conduction: normal    ST segments:     ST segments:  Normal  T waves:     T waves: inverted      Inverted:  AVR and V1  Comments:      qtc 433, no STEMI             ED Course  ED Course as of Jul 13 2100   Tue Jul 13, 2021 2015    IMPRESSION:        1  No evidence of acute intracranial hemorrhage    2  No evidence of hemodynamic significant stenosis, aneurysm or dissection                    HEART Risk Score      Most Recent Value   Heart Score Risk Calculator   History  0 Filed at: 07/13/2021 2020   ECG  0 Filed at: 07/13/2021 2020   Age  0 Filed at: 07/13/2021 2020   Risk Factors  0 Filed at: 07/13/2021 2020   Troponin  0 Filed at: 07/13/2021 2020   HEART Score  0 Filed at: 07/13/2021 2020                      SBIRT 20yo+      Most Recent Value   SBIRT (25 yo +)   In order to provide better care to our patients, we are screening all of our patients for alcohol and drug use  Would it be okay to ask you these screening questions? No Filed at: 07/13/2021 1847                    [facility]  Number of Diagnoses or Management Options  Chest pain  Neck pain  Vertigo  Diagnosis management comments: 46 yo M presenting for evaluation of dizziness, neck pain and cp, pt reports having episdoes similar to this over the last 2 years but today is worse, occurred while driving, CTA head and neck normal, pt continues with dizziness and unable to ambulate without symptoms worsening, continues with shooting neck pain, will admit for MRI and vertigo    Portions of the record may have been created with voice recognition software  Occasional wrong word or "sound a like" substitutions may have occurred due to the inherent limitations of voice recognition software  Read the chart carefully and recognize, using context, where substitutions have occurred  Disposition  Final diagnoses:   Vertigo   Chest pain   Neck pain     Time reflects when diagnosis was documented in both MDM as applicable and the Disposition within this note     Time User Action Codes Description Comment    7/13/2021  8:53 PM Tarik Beady Add [R42] Vertigo     7/13/2021  8:53 PM Tarik Beady Add [R07 9] Chest pain     7/13/2021  8:53 PM Tarik Beady Add [M54 2] Neck pain       ED Disposition     ED Disposition Condition Date/Time Comment    Admit Stable Tue Jul 13, 2021  8:53 PM Case was discussed with RENATO and the patient's admission status was agreed to be Admission Status: observation status to the service of Dr David Perez           Follow-up Information    None         Patient's Medications   Discharge Prescriptions    No medications on file     No discharge procedures on file     PDMP Review     None          ED Provider  Electronically Signed by           Millicent Lui PA-C  07/13/21 4044       Millicent Lui PA-C  07/13/21 5152

## 2021-07-13 NOTE — Clinical Note
Case was discussed with RENATO and the patient's admission status was agreed to be Admission Status: observation status to the service of Dr Remington Torres

## 2021-07-14 ENCOUNTER — TRANSITIONAL CARE MANAGEMENT (OUTPATIENT)
Dept: INTERNAL MEDICINE CLINIC | Facility: CLINIC | Age: 41
End: 2021-07-14

## 2021-07-14 VITALS
RESPIRATION RATE: 16 BRPM | WEIGHT: 215.83 LBS | DIASTOLIC BLOOD PRESSURE: 71 MMHG | HEART RATE: 62 BPM | BODY MASS INDEX: 30.97 KG/M2 | TEMPERATURE: 97.8 F | OXYGEN SATURATION: 99 % | SYSTOLIC BLOOD PRESSURE: 113 MMHG

## 2021-07-14 LAB
ANION GAP SERPL CALCULATED.3IONS-SCNC: 6 MMOL/L (ref 5–14)
BILIRUB UR QL STRIP: NEGATIVE
BUN SERPL-MCNC: 13 MG/DL (ref 5–25)
CALCIUM SERPL-MCNC: 8.9 MG/DL (ref 8.4–10.2)
CHLORIDE SERPL-SCNC: 106 MMOL/L (ref 97–108)
CLARITY UR: CLEAR
CO2 SERPL-SCNC: 27 MMOL/L (ref 22–30)
COLOR UR: YELLOW
CREAT SERPL-MCNC: 0.76 MG/DL (ref 0.7–1.5)
ERYTHROCYTE [DISTWIDTH] IN BLOOD BY AUTOMATED COUNT: 13.7 %
GFR SERPL CREATININE-BSD FRML MDRD: 115 ML/MIN/1.73SQ M
GLUCOSE SERPL-MCNC: 94 MG/DL (ref 70–99)
GLUCOSE UR STRIP-MCNC: NEGATIVE MG/DL
HCT VFR BLD AUTO: 42.6 % (ref 41–53)
HGB BLD-MCNC: 14.8 G/DL (ref 13.5–17.5)
HGB UR QL STRIP.AUTO: NEGATIVE
KETONES UR STRIP-MCNC: NEGATIVE MG/DL
LEUKOCYTE ESTERASE UR QL STRIP: NEGATIVE
MCH RBC QN AUTO: 31 PG (ref 26–34)
MCHC RBC AUTO-ENTMCNC: 34.7 G/DL (ref 31–36)
MCV RBC AUTO: 89 FL (ref 80–100)
NITRITE UR QL STRIP: NEGATIVE
PH UR STRIP.AUTO: 6 [PH]
PLATELET # BLD AUTO: 231 THOUSANDS/UL (ref 150–450)
PMV BLD AUTO: 7 FL (ref 8.9–12.7)
POTASSIUM SERPL-SCNC: 4.2 MMOL/L (ref 3.6–5)
PROT UR STRIP-MCNC: NEGATIVE MG/DL
RBC # BLD AUTO: 4.77 MILLION/UL (ref 4.5–5.9)
SODIUM SERPL-SCNC: 139 MMOL/L (ref 137–147)
SP GR UR STRIP.AUTO: 1.01 (ref 1–1.04)
UROBILINOGEN UA: NEGATIVE MG/DL
WBC # BLD AUTO: 6.9 THOUSAND/UL (ref 4.5–11)

## 2021-07-14 PROCEDURE — 36415 COLL VENOUS BLD VENIPUNCTURE: CPT | Performed by: PHYSICIAN ASSISTANT

## 2021-07-14 PROCEDURE — 99220 PR INITIAL OBSERVATION CARE/DAY 70 MINUTES: CPT | Performed by: FAMILY MEDICINE

## 2021-07-14 PROCEDURE — 80048 BASIC METABOLIC PNL TOTAL CA: CPT | Performed by: PHYSICIAN ASSISTANT

## 2021-07-14 PROCEDURE — NC001 PR NO CHARGE: Performed by: FAMILY MEDICINE

## 2021-07-14 PROCEDURE — 85027 COMPLETE CBC AUTOMATED: CPT | Performed by: PHYSICIAN ASSISTANT

## 2021-07-14 PROCEDURE — 81003 URINALYSIS AUTO W/O SCOPE: CPT | Performed by: PHYSICIAN ASSISTANT

## 2021-07-14 RX ORDER — ACETAMINOPHEN 325 MG/1
650 TABLET ORAL EVERY 6 HOURS PRN
Refills: 0
Start: 2021-07-14

## 2021-07-14 RX ORDER — ONDANSETRON 2 MG/ML
4 INJECTION INTRAMUSCULAR; INTRAVENOUS EVERY 6 HOURS PRN
Status: DISCONTINUED | OUTPATIENT
Start: 2021-07-14 | End: 2021-07-14 | Stop reason: HOSPADM

## 2021-07-14 RX ORDER — MECLIZINE HYDROCHLORIDE 25 MG/1
25 TABLET ORAL EVERY 8 HOURS PRN
Qty: 20 TABLET | Refills: 0 | Status: SHIPPED | OUTPATIENT
Start: 2021-07-14

## 2021-07-14 RX ORDER — NICOTINE 21 MG/24HR
1 PATCH, TRANSDERMAL 24 HOURS TRANSDERMAL DAILY
Status: DISCONTINUED | OUTPATIENT
Start: 2021-07-14 | End: 2021-07-14 | Stop reason: HOSPADM

## 2021-07-14 RX ORDER — ACETAMINOPHEN 325 MG/1
650 TABLET ORAL EVERY 6 HOURS PRN
Refills: 0
Start: 2021-07-14 | End: 2021-07-14 | Stop reason: SDUPTHER

## 2021-07-14 RX ORDER — MECLIZINE HYDROCHLORIDE 25 MG/1
25 TABLET ORAL EVERY 8 HOURS PRN
Qty: 20 TABLET | Refills: 0 | Status: SHIPPED | OUTPATIENT
Start: 2021-07-14 | End: 2021-07-14 | Stop reason: SDUPTHER

## 2021-07-14 RX ORDER — GABAPENTIN 100 MG/1
100 CAPSULE ORAL 3 TIMES DAILY
Status: DISCONTINUED | OUTPATIENT
Start: 2021-07-14 | End: 2021-07-14 | Stop reason: HOSPADM

## 2021-07-14 RX ORDER — ACETAMINOPHEN 325 MG/1
650 TABLET ORAL EVERY 6 HOURS PRN
Status: DISCONTINUED | OUTPATIENT
Start: 2021-07-14 | End: 2021-07-14 | Stop reason: HOSPADM

## 2021-07-14 NOTE — ED NOTES
Patient noted to be sleeping with adequate respirations and slight positional changes  Will retake vital signs when patient awake or when awakened for blood work       Flor Melo RN  07/14/21 5834

## 2021-07-14 NOTE — ED NOTES
Patient appears to be sleeping with adequate respirations in no apparent discomfort or distress       Valente Hernández RN  07/14/21 9678

## 2021-07-14 NOTE — ED NOTES
Patient noted to be sleeping with adequate respirations and no apparent discomfort or distress noted       Aide Jon RN  07/14/21 5362

## 2021-07-14 NOTE — ED NOTES
Patient appears to be sleeping with adequate respirations with occasional self position changes noted       Mariana Dean, CALLIE  07/14/21 3630

## 2021-07-14 NOTE — DISCHARGE SUMMARY
Discharge Summary - [facility]    Patient Information: Ariadna Gann 45 y o  male MRN: [id number]  Unit/Bed#: ED 03 Encounter: [id number]    Discharging Physician / Practitioner: Aki Jean Baptiste MD  PCP: LOVE Davey  Admission Date: 7/13/2021  Discharge Date: 07/14/21    Disposition:     Home     Reason for Admission:     Discharge Diagnoses:     Principal Problem:    Vertigo  Active Problems:    Nicotine dependence    Obesity (BMI 30-39  9)  Resolved Problems:    * No resolved hospital problems  *      Consultations During Hospital Stay:      Procedures Performed:     ·     Significant Findings / Test Results:      CTA-  No evidence of acute intracranial hemorrhage  No evidence of hemodynamic significant stenosis, aneurysm or dissection        Incidental Findings:   ·     Test Results Pending at Discharge (will require follow up):   ·      Outpatient Tests Requested:  ·     Complications:   none    Hospital Course:     Ariadna Gann is a 45 y o  male patient who originally presented to the hospital on 7/13/2021 due to vertigo  Patient had a CT scan which was unremarkable  Patient was admitted to the hospital and he stayed in the emergency room overnight  When he woke up in the morning his symptoms were resolved  Patient reported that he had multiple episodes of similar symptoms in the past and he has follows up with a neurologist as an outpatient  Recommended vestibular therapy  Patient was eager to go home and he was discharged from the ED in a stable condition    Condition at Discharge: good     Discharge Day Visit / Exam:     * Please refer to separate progress note for these details *    Discussion with Family:       Discharge instructions/Information to patient and family:   See after visit summary for information provided to patient and family  Provisions for Follow-Up Care:  See after visit summary for information related to follow-up care and any pertinent home health orders  Planned Readmission:  none    Discharge Statement:  I spent 45  minutes discharging the patient  This time was spent on the day of discharge  I had direct contact with the patient on the day of discharge  Greater than 50% of the total time was spent examining patient, answering all patient questions, arranging and discussing plan of care with patient as well as directly providing post-discharge instructions  Additional time then spent on discharge activities  Discharge Medications:  See after visit summary for reconciled discharge medications provided to patient and family  ** Please Note: This note has been constructed using a voice recognition system   **

## 2021-07-14 NOTE — ED NOTES
Patient appears to be sleeping with his eyes closed in no apparent discomfort or distress with adequate respirations       Mariana Dean RN  07/14/21 1668

## 2021-07-14 NOTE — ED NOTES
Patient resting quietly in bed  He presently states that he has no pain, no numbness or tingling and no dizziness and no chest pain  He states the medicine that he received earlier helped his dizziness  He does state that he gets intermittent body pain and tingling in various parts of his body but presently he does not have any of these symptoms  Lights dimmed, door closed and comfort measures given with call bell within reach and instructions for same       Jolena Kayser, RN  07/14/21 0195

## 2021-07-14 NOTE — ED NOTES
Patient reports that he is feeling well this morning - states that he woke up with no blurred vision or chest pain or generalized pain or discomfort  No c/o dizziness or lightheadedness        Nina Martin RN  07/14/21 2553

## 2021-07-14 NOTE — ED NOTES
Patient is resting comfortably  Sleeping in room no signs and symtpms of distress at this time  Allowing patient to sleep based on report given by off going shift will re-evaluate when awake  No signs of distress or discomfort Chest rise and fall observed from door    Patient snoring mildly     Pari Oseguera RN  07/14/21 1250

## 2021-07-14 NOTE — H&P
[facility]  H&P- Mikal Jarvis 1980, 45 y o  male MRN: [id number]  Unit/Bed#: ED 03 Encounter: [id number]  Primary Care Provider: LOVE Little   Date and time admitted to hospital: 7/13/2021  6:00 PM    * Vertigo  Assessment & Plan  · Placed in observation Medicine  · Give Antivert 25 mg p o  q 8 hours p r n  · Patient has longstanding history of similar episodes  · Patient additionally reportedly sees Neurology   · Patient symptoms resolved  · Patient will be discharged home today with outpatient follow-up  · Recommended vestibular therapy as outpatient    Obesity (BMI 30-39  9)  Assessment & Plan  Encourage healthy weight loss and supportive care    Nicotine dependence  Assessment & Plan  Will give nicotine 21 mg transdermal daily    VTE Prophylaxis: Low risk  /   Code Status:  Full code  POLST: POLST form is not discussed and not completed at this time  Discussion with family:     Anticipated Length of Stay:  Patient will be admitted on an Observation basis with an anticipated length of stay of > 2 midnights  Justification for Hospital Stay:   vertigo    Total Time for Visit, including Counseling / Coordination of Care 60mtsGreater than 50% of this total time spent on direct patient counseling and coordination of care  Chief Complaint:       History of Present Illness:    Mikal Jarvis is a 45 y o  male who presents with dizziness  This is a 61-year-old male who is a recovering alcoholic came to the hospital with dizziness and blurry vision  Patient reported that his symptoms started yesterday morning  Patient reported that he had multiple episodes of similar presentation and usually resolves on its own  Patient was evaluated by Neurology and also evaluated by rheumatology  Workup has been negative  Patient came to the hospital   Had a CT scan done which was unremarkable    Patient was admitted to the hospital bad while he was staying in the emergency room for waiting for a bed his symptoms completely resolved when he woke up in the morning he was back to his baseline  At this time patient expressed interest in discharge with outpatient follow-up with his primary care physician and also with neurologist   Patient was able to ambulate without any deficits in the emergency room  Patient will be discharged from the emergency room with outpatient follow-up    Review of Systems:    Review of Systems   Constitutional: Negative  HENT: Negative  Eyes: Positive for visual disturbance  Respiratory: Negative  Cardiovascular: Negative  Gastrointestinal: Negative  Negative for abdominal distention  Genitourinary: Negative  Musculoskeletal: Positive for back pain and neck pain  Neurological: Positive for dizziness, light-headedness and headaches  Hematological: Negative  Psychiatric/Behavioral: Negative  Past Medical and Surgical History:     Past Medical History:   Diagnosis Date    Lyme disease     Panic attack     Recovering alcoholic ([facility] [address] )        Past Surgical History:   Procedure Laterality Date    ROOT CANAL         Meds/Allergies:    Prior to Admission medications    Medication Sig Start Date End Date Taking?  Authorizing Provider   acetaminophen (TYLENOL) 325 mg tablet Take 2 tablets (650 mg total) by mouth every 6 (six) hours as needed for mild pain, headaches or fever 7/14/21   Juan José Bahena, DO   gabapentin (NEURONTIN) 100 mg capsule Take 1 capsule (100 mg total) by mouth 3 (three) times a day  Patient not taking: Reported on 7/13/2021 12/4/20   LOVE Rose   meclizine (ANTIVERT) 25 mg tablet Take 1 tablet (25 mg total) by mouth every 8 (eight) hours as needed for dizziness 7/14/21   Hortencia Diana,    acetaminophen (TYLENOL) 325 mg tablet Take 2 tablets (650 mg total) by mouth every 6 (six) hours as needed for mild pain, headaches or fever 7/14/21 7/14/21  Sameer Souza MD   meclizine (ANTIVERT) 25 mg tablet Take 1 tablet (25 mg total) by mouth every 8 (eight) hours as needed for dizziness 7/14/21 7/14/21  Jessica Jc MD     I have reviewed home medications with a medical source (PCP, Pharmacy, other)  Allergies: No Known Allergies    Social History:     Marital Status: /Civil Union   Occupation:   Patient Pre-hospital Living Situation:  Lives at home with family  Patient Pre-hospital Level of Mobility:   Patient Pre-hospital Diet Restrictions:   Substance Use History:   Social History     Substance and Sexual Activity   Alcohol Use No     Social History     Tobacco Use   Smoking Status Current Every Day Smoker    Packs/day: 1 00    Years: 25 00    Pack years: 25 00    Types: Cigarettes   Smokeless Tobacco Never Used     Social History     Substance and Sexual Activity   Drug Use Yes    Types: Marijuana       Family History:    Family History   Adopted: Yes       Physical Exam:     Vitals:   Blood Pressure: 113/71 (07/14/21 0600)  Pulse: 62 (07/14/21 0929)  Temperature: 97 8 °F (36 6 °C) (07/14/21 0600)  Temp Source: Oral (07/14/21 0600)  Respirations: 16 (07/14/21 0929)  Weight - Scale: 97 9 kg (215 lb 13 3 oz) (07/13/21 1809)  SpO2: 99 % (07/14/21 0929)    Physical Exam  Constitutional:       General: He is not in acute distress  HENT:      Head: Normocephalic  Nose: Nose normal    Eyes:      General: No scleral icterus  Cardiovascular:      Rate and Rhythm: Normal rate  Pulses: Normal pulses  Pulmonary:      Effort: Pulmonary effort is normal       Breath sounds: Normal breath sounds  Abdominal:      General: Abdomen is flat  There is no distension  Musculoskeletal:         General: Normal range of motion  Cervical back: Normal range of motion and neck supple  Skin:     Coloration: Skin is not jaundiced  Neurological:      General: No focal deficit present  Mental Status: He is alert  Additional Data:     Lab Results: I have personally reviewed pertinent reports  Results from last 7 days   Lab Units 07/14/21  0619 07/13/21  1852   WBC Thousand/uL 6 90 10 80   HEMOGLOBIN g/dL 14 8 15 1   HEMATOCRIT % 42 6 43 3   PLATELETS Thousands/uL 231 260   NEUTROS PCT %  --  66*   LYMPHS PCT %  --  22*   MONOS PCT %  --  7   EOS PCT %  --  5     Results from last 7 days   Lab Units 07/14/21  0618 07/13/21  1855   SODIUM mmol/L 139 138   POTASSIUM mmol/L 4 2 3 7   CHLORIDE mmol/L 106 103   CO2 mmol/L 27 27   BUN mg/dL 13 12   CREATININE mg/dL 0 76 0 80   ANION GAP mmol/L 6 8   CALCIUM mg/dL 8 9 9 5   ALBUMIN g/dL  --  4 4   TOTAL BILIRUBIN mg/dL  --  0 66   ALK PHOS U/L  --  55   ALT U/L  --  37   AST U/L  --  34   GLUCOSE RANDOM mg/dL 94 84     Results from last 7 days   Lab Units 07/13/21  1852   INR  1 06                   Imaging: I have personally reviewed pertinent reports  CTA head and neck with and without contrast   Final Result by Shannon Rainey MD (07/13 2013)         1  No evidence of acute intracranial hemorrhage  2  No evidence of hemodynamic significant stenosis, aneurysm or dissection  Workstation performed: [id number]             EKG, Pathology, and Other Studies Reviewed on Admission:   · EKG:    Allscripts / Epic Records Reviewed: Yes     ** Please Note: This note has been constructed using a voice recognition system   **

## 2021-07-14 NOTE — ASSESSMENT & PLAN NOTE
· Placed in observation Medicine  · Give Antivert 25 mg p o  q 8 hours p r n    · Patient has longstanding history of similar episodes  · Patient additionally reportedly sees Neurology   · Patient symptoms resolved  · Patient will be discharged home today with outpatient follow-up  · Recommended vestibular therapy as outpatient

## 2021-07-15 NOTE — PROGRESS NOTES
1st attempt-LVM asking pt to return call for TCM documentation and TCM appointment        By Torin Álvarez

## 2021-10-06 ENCOUNTER — TELEPHONE (OUTPATIENT)
Dept: RHEUMATOLOGY | Facility: CLINIC | Age: 41
End: 2021-10-06

## 2022-10-12 ENCOUNTER — TELEPHONE (OUTPATIENT)
Dept: INTERNAL MEDICINE CLINIC | Facility: CLINIC | Age: 42
End: 2022-10-12

## 2023-07-06 NOTE — DISCHARGE INSTRUCTIONS
Patient notified of lab orders.    Take Tylenol 650mg and ibuprofen 600mg every 6 hours for pain  Take Robaxin (muscle relaxer) as needed  Use your incentive spirometer 2-3x per hour  Follow-up with your family doctor  Return to ER with worsening symptoms

## 2024-07-26 NOTE — ED NOTES
07/26/24      Called sister  updated      MOHSENLALIT Sister (Guardian)  [phone]       ,Lon Quigley MD     Patient discharged by dr Eric Carmona, RN  03/17/18 0437